# Patient Record
Sex: FEMALE | Race: WHITE | NOT HISPANIC OR LATINO
[De-identification: names, ages, dates, MRNs, and addresses within clinical notes are randomized per-mention and may not be internally consistent; named-entity substitution may affect disease eponyms.]

---

## 2018-04-04 PROBLEM — Z00.00 ENCOUNTER FOR PREVENTIVE HEALTH EXAMINATION: Status: ACTIVE | Noted: 2018-04-04

## 2018-06-05 ENCOUNTER — APPOINTMENT (OUTPATIENT)
Dept: PEDIATRIC ALLERGY IMMUNOLOGY | Facility: CLINIC | Age: 58
End: 2018-06-05
Payer: COMMERCIAL

## 2018-06-05 VITALS
HEIGHT: 64 IN | DIASTOLIC BLOOD PRESSURE: 79 MMHG | OXYGEN SATURATION: 94 % | WEIGHT: 293 LBS | HEART RATE: 90 BPM | BODY MASS INDEX: 50.02 KG/M2 | SYSTOLIC BLOOD PRESSURE: 131 MMHG

## 2018-06-05 DIAGNOSIS — Z80.3 FAMILY HISTORY OF MALIGNANT NEOPLASM OF BREAST: ICD-10-CM

## 2018-06-05 DIAGNOSIS — E03.9 HYPOTHYROIDISM, UNSPECIFIED: ICD-10-CM

## 2018-06-05 DIAGNOSIS — Z86.79 PERSONAL HISTORY OF OTHER DISEASES OF THE CIRCULATORY SYSTEM: ICD-10-CM

## 2018-06-05 DIAGNOSIS — Z01.89 ENCOUNTER FOR OTHER SPECIFIED SPECIAL EXAMINATIONS: ICD-10-CM

## 2018-06-05 DIAGNOSIS — J45.20 MILD INTERMITTENT ASTHMA, UNCOMPLICATED: ICD-10-CM

## 2018-06-05 DIAGNOSIS — T88.7XXA UNSPECIFIED ADVERSE EFFECT OF DRUG OR MEDICAMENT, INITIAL ENCOUNTER: ICD-10-CM

## 2018-06-05 DIAGNOSIS — E78.00 PURE HYPERCHOLESTEROLEMIA, UNSPECIFIED: ICD-10-CM

## 2018-06-05 DIAGNOSIS — R00.2 PALPITATIONS: ICD-10-CM

## 2018-06-05 DIAGNOSIS — Z82.49 FAMILY HISTORY OF ISCHEMIC HEART DISEASE AND OTHER DISEASES OF THE CIRCULATORY SYSTEM: ICD-10-CM

## 2018-06-05 DIAGNOSIS — Z95.0 PRESENCE OF CARDIAC PACEMAKER: ICD-10-CM

## 2018-06-05 DIAGNOSIS — G40.909 EPILEPSY, UNSPECIFIED, NOT INTRACTABLE, W/OUT STATUS EPILEPTICUS: ICD-10-CM

## 2018-06-05 PROCEDURE — 36415 COLL VENOUS BLD VENIPUNCTURE: CPT

## 2018-06-05 PROCEDURE — 95018 ALL TSTG PERQ&IQ DRUGS/BIOL: CPT

## 2018-06-05 PROCEDURE — 99245 OFF/OP CONSLTJ NEW/EST HI 55: CPT | Mod: 25

## 2018-06-05 RX ORDER — EPINEPHRINE 0.3 MG/.3ML
0.3 INJECTION INTRAMUSCULAR
Refills: 0 | Status: ACTIVE | COMMUNITY

## 2018-06-05 RX ORDER — CHOLECALCIFEROL (VITAMIN D3) 50 MCG
50 MCG TABLET ORAL
Refills: 0 | Status: ACTIVE | COMMUNITY

## 2018-06-05 RX ORDER — ALBUTEROL SULFATE 2.5 MG/3ML
(2.5 MG/3ML) SOLUTION RESPIRATORY (INHALATION)
Refills: 0 | Status: ACTIVE | COMMUNITY

## 2018-06-05 RX ORDER — LAMOTRIGINE 250 MG/1
250 TABLET, FILM COATED, EXTENDED RELEASE ORAL
Refills: 0 | Status: ACTIVE | COMMUNITY

## 2018-06-05 RX ORDER — ASPIRIN 81 MG
81 TABLET, DELAYED RELEASE (ENTERIC COATED) ORAL
Refills: 0 | Status: ACTIVE | COMMUNITY

## 2018-06-05 RX ORDER — LEVETIRACETAM 500 MG/1
500 TABLET, FILM COATED ORAL
Refills: 0 | Status: ACTIVE | COMMUNITY

## 2018-06-05 RX ORDER — FOLIC ACID 1 MG/1
1 TABLET ORAL
Refills: 0 | Status: ACTIVE | COMMUNITY

## 2018-06-05 RX ORDER — CYCLOBENZAPRINE HYDROCHLORIDE 10 MG/1
10 TABLET, FILM COATED ORAL
Refills: 0 | Status: ACTIVE | COMMUNITY

## 2018-06-05 RX ORDER — PNV NO.95/FERROUS FUM/FOLIC AC 28MG-0.8MG
TABLET ORAL
Refills: 0 | Status: ACTIVE | COMMUNITY

## 2018-06-05 RX ORDER — ALBUTEROL SULFATE 90 UG/1
108 (90 BASE) AEROSOL, METERED RESPIRATORY (INHALATION)
Refills: 0 | Status: ACTIVE | COMMUNITY

## 2018-06-05 RX ORDER — METOPROLOL SUCCINATE 50 MG/1
50 TABLET, EXTENDED RELEASE ORAL
Refills: 0 | Status: ACTIVE | COMMUNITY

## 2018-06-08 LAB
BASOPHILS # BLD AUTO: 0 K/UL
BASOPHILS NFR BLD AUTO: 0 %
EOSINOPHIL # BLD AUTO: 0 K/UL
EOSINOPHIL NFR BLD AUTO: 0 %
HCT VFR BLD CALC: 47.4 %
HGB BLD-MCNC: 14.8 G/DL
IMM GRANULOCYTES NFR BLD AUTO: 0.2 %
LYMPHOCYTES # BLD AUTO: 1.79 K/UL
LYMPHOCYTES NFR BLD AUTO: 27 %
MAN DIFF?: NORMAL
MCHC RBC-ENTMCNC: 31.2 GM/DL
MCHC RBC-ENTMCNC: 31.6 PG
MCV RBC AUTO: 101.1 FL
MONOCYTES # BLD AUTO: 0.48 K/UL
MONOCYTES NFR BLD AUTO: 7.3 %
NEUTROPHILS # BLD AUTO: 4.34 K/UL
NEUTROPHILS NFR BLD AUTO: 65.5 %
PLATELET # BLD AUTO: 224 K/UL
RBC # BLD: 4.69 M/UL
RBC # FLD: 14.8 %
TRYPTASE: 2.9 NG/ML
WBC # FLD AUTO: 6.62 K/UL

## 2018-06-12 LAB — TOTAL IGE SMQN RAST: 40 KU/L

## 2018-07-03 ENCOUNTER — APPOINTMENT (OUTPATIENT)
Dept: PEDIATRIC ALLERGY IMMUNOLOGY | Facility: CLINIC | Age: 58
End: 2018-07-03

## 2018-07-30 ENCOUNTER — APPOINTMENT (OUTPATIENT)
Dept: PEDIATRIC ALLERGY IMMUNOLOGY | Facility: CLINIC | Age: 58
End: 2018-07-30
Payer: COMMERCIAL

## 2018-07-30 VITALS — SYSTOLIC BLOOD PRESSURE: 159 MMHG | DIASTOLIC BLOOD PRESSURE: 86 MMHG | OXYGEN SATURATION: 97 % | HEART RATE: 86 BPM

## 2018-07-30 VITALS
WEIGHT: 293 LBS | HEART RATE: 85 BPM | DIASTOLIC BLOOD PRESSURE: 67 MMHG | OXYGEN SATURATION: 93 % | HEIGHT: 64 IN | SYSTOLIC BLOOD PRESSURE: 122 MMHG | BODY MASS INDEX: 50.02 KG/M2

## 2018-07-30 VITALS — DIASTOLIC BLOOD PRESSURE: 84 MMHG | OXYGEN SATURATION: 92 % | HEART RATE: 78 BPM | SYSTOLIC BLOOD PRESSURE: 127 MMHG

## 2018-07-30 DIAGNOSIS — T78.40XA ALLERGY, UNSPECIFIED, INITIAL ENCOUNTER: ICD-10-CM

## 2018-07-30 DIAGNOSIS — L50.8 OTHER URTICARIA: ICD-10-CM

## 2018-07-30 DIAGNOSIS — T36.0X5A GENERALIZED SKIN ERUPTION DUE TO DRUGS AND MEDICAMENTS TAKEN INTERNALLY: ICD-10-CM

## 2018-07-30 DIAGNOSIS — Z88.0 ALLERGY STATUS TO PENICILLIN: ICD-10-CM

## 2018-07-30 DIAGNOSIS — Z88.9 ALLERGY STATUS TO UNSPECIFIED DRUGS, MEDICAMENTS AND BIOLOGICAL SUBSTANCES: ICD-10-CM

## 2018-07-30 DIAGNOSIS — L27.0 GENERALIZED SKIN ERUPTION DUE TO DRUGS AND MEDICAMENTS TAKEN INTERNALLY: ICD-10-CM

## 2018-07-30 PROCEDURE — 95076 INGEST CHALLENGE INI 120 MIN: CPT

## 2018-07-30 RX ADMIN — CETIRIZINE HYDROCHLORIDE 0 MG: 10 TABLET ORAL at 00:00

## 2018-08-31 LAB
IGE RECEPTOR AB: 6.4 %
IGE RECEPTOR COMMENT: NORMAL

## 2020-05-01 RX ORDER — CETIRIZINE HYDROCHLORIDE 10 MG/1
10 TABLET, COATED ORAL
Qty: 0 | Refills: 0 | Status: COMPLETED | OUTPATIENT
Start: 2018-07-30

## 2021-05-05 ENCOUNTER — APPOINTMENT (OUTPATIENT)
Dept: OBGYN | Facility: CLINIC | Age: 61
End: 2021-05-05

## 2021-06-23 ENCOUNTER — APPOINTMENT (OUTPATIENT)
Dept: OBGYN | Facility: CLINIC | Age: 61
End: 2021-06-23
Payer: MEDICARE

## 2021-06-23 VITALS
TEMPERATURE: 97.5 F | SYSTOLIC BLOOD PRESSURE: 126 MMHG | DIASTOLIC BLOOD PRESSURE: 64 MMHG | BODY MASS INDEX: 44.05 KG/M2 | WEIGHT: 258 LBS | HEIGHT: 64 IN | RESPIRATION RATE: 16 BRPM

## 2021-06-23 DIAGNOSIS — Z01.411 ENCOUNTER FOR GYNECOLOGICAL EXAMINATION (GENERAL) (ROUTINE) WITH ABNORMAL FINDINGS: ICD-10-CM

## 2021-06-23 DIAGNOSIS — R22.2 LOCALIZED SWELLING, MASS AND LUMP, TRUNK: ICD-10-CM

## 2021-06-23 DIAGNOSIS — Z86.39 PERSONAL HISTORY OF OTHER ENDOCRINE, NUTRITIONAL AND METABOLIC DISEASE: ICD-10-CM

## 2021-06-23 DIAGNOSIS — Z87.09 PERSONAL HISTORY OF OTHER DISEASES OF THE RESPIRATORY SYSTEM: ICD-10-CM

## 2021-06-23 DIAGNOSIS — N63.20 UNSPECIFIED LUMP IN THE LEFT BREAST, UNSPECIFIED QUADRANT: ICD-10-CM

## 2021-06-23 DIAGNOSIS — Z78.9 OTHER SPECIFIED HEALTH STATUS: ICD-10-CM

## 2021-06-23 DIAGNOSIS — C53.9 MALIGNANT NEOPLASM OF CERVIX UTERI, UNSPECIFIED: ICD-10-CM

## 2021-06-23 DIAGNOSIS — Z85.3 PERSONAL HISTORY OF MALIGNANT NEOPLASM OF BREAST: ICD-10-CM

## 2021-06-23 PROCEDURE — 99072 ADDL SUPL MATRL&STAF TM PHE: CPT

## 2021-06-23 PROCEDURE — 99203 OFFICE O/P NEW LOW 30 MIN: CPT | Mod: 25

## 2021-06-23 PROCEDURE — G0101: CPT

## 2021-06-23 NOTE — DISCUSSION/SUMMARY
[FreeTextEntry1] : 62 yo here for well woman exam, findings of left breast mass. \par 1) Health maintenance: \par Pap + HPV, given hx of cervical cancer recommend continued surveillance with screening paps\par Colonoscopy referral given\par \par 2) Left breast mass, hx of left breast cancer in past:\par Diagnostic mammogram bilateral\par Left breast ultrasound\par \par 3) Superficial abdominal mass\par Recommend general surgery evaluation, pt wants to follow up with PCP\par \par 4) Hx of cervical cancer per pt:\par Recommend continued surveillance with pelvic exams and screening paps, no evidence of disease today\par \par Return in 1 yr or prn

## 2021-06-23 NOTE — PHYSICAL EXAM
[Appropriately responsive] : appropriately responsive [Alert] : alert [No Acute Distress] : no acute distress [No Lymphadenopathy] : no lymphadenopathy [Soft] : soft [Non-tender] : non-tender [Non-distended] : non-distended [No HSM] : No HSM [No Lesions] : no lesions [No Mass] : no mass [Oriented x3] : oriented x3 [Examination Of The Breasts] : a normal appearance [No Masses] : no breast masses were palpable [Labia Majora] : normal [Labia Minora] : normal [Normal] : normal [Uterine Adnexae] : normal [No Discharge] : no discharge [___cm] : a ~M [unfilled] ~Ucm superior lateral quadrant mass was palpated [Firm] : firm [Mobile] : mobile [Nontender] : nontender [1:00] : in the 1:00 position [___cm Radial Distance from the Nipple] : [unfilled] ~Ucm radially from the nipple [Atrophy] : atrophy [Dry Mucosa] : dry mucosa [Rectocele] : a rectocele [Absent] : absent [FreeTextEntry7] : 3cm firm mobile superficial mass nontender left midabdomen, obese [FreeTextEntry4] : vaginal cuff intact, no lesions

## 2021-06-23 NOTE — HISTORY OF PRESENT ILLNESS
[Patient reported mammogram was abnormal] : Patient reported mammogram was abnormal [Patient reported PAP Smear was normal] : Patient reported PAP Smear was normal [Patient reported colonoscopy was normal] : Patient reported colonoscopy was normal [postmenopausal] : postmenopausal [Monogamous (Male Partner)] : is monogamous with a male partner [Y] : Positive pregnancy history [Mammogramdate] : 3 yrs ago [TextBox_19] : benign cysts [PapSmeardate] : 3 yrs ago [ColonoscopyDate] : 2016 [LMPDate] : age 28 [PGHxTotal] : 3 [[facility] : 2 [[facility]] : 2 [PGxEctopic] : 1 [FreeTextEntry1] : NSVDx2, ectopic x 1. +h/o fibroids and cervical cancer s/p hyst/BSO, denies STI

## 2021-06-23 NOTE — LETTER GREETING
[Dear  ___] : Dear  [unfilled], [FreeTextEntry1] : I had the pleasure of evaluating your patient, DOMONIQUE WHITT . Please see my summary of recommendations followed by my full note. \par \par Thank you for allowing me to participate in the care of this patient. If you have any questions, please do not hesitate to contact me.\par \par Sincerely,\par \par Adilia Baez MD, FACOG \par [facility]\par Obstetrics and Gynecology in [place]\[address], [facility] [address]\[place] [address]\[facility] Phone: [phone] Fax: [phone]

## 2021-06-25 LAB — HPV HIGH+LOW RISK DNA PNL CVX: NOT DETECTED

## 2021-06-28 LAB — CYTOLOGY CVX/VAG DOC THIN PREP: ABNORMAL

## 2022-04-07 ENCOUNTER — OUTPATIENT (OUTPATIENT)
Dept: OUTPATIENT SERVICES | Facility: HOSPITAL | Age: 62
LOS: 1 days | End: 2022-04-07
Payer: MEDICARE

## 2022-04-07 VITALS
RESPIRATION RATE: 20 BRPM | HEIGHT: 65 IN | WEIGHT: 293 LBS | DIASTOLIC BLOOD PRESSURE: 70 MMHG | TEMPERATURE: 98 F | HEART RATE: 74 BPM | SYSTOLIC BLOOD PRESSURE: 130 MMHG | OXYGEN SATURATION: 96 %

## 2022-04-07 DIAGNOSIS — Z29.9 ENCOUNTER FOR PROPHYLACTIC MEASURES, UNSPECIFIED: ICD-10-CM

## 2022-04-07 DIAGNOSIS — Z98.1 ARTHRODESIS STATUS: Chronic | ICD-10-CM

## 2022-04-07 DIAGNOSIS — Z01.818 ENCOUNTER FOR OTHER PREPROCEDURAL EXAMINATION: ICD-10-CM

## 2022-04-07 DIAGNOSIS — Z98.890 OTHER SPECIFIED POSTPROCEDURAL STATES: Chronic | ICD-10-CM

## 2022-04-07 DIAGNOSIS — I25.10 ATHEROSCLEROTIC HEART DISEASE OF NATIVE CORONARY ARTERY WITHOUT ANGINA PECTORIS: ICD-10-CM

## 2022-04-07 DIAGNOSIS — M46.02 SPINAL ENTHESOPATHY, CERVICAL REGION: ICD-10-CM

## 2022-04-07 DIAGNOSIS — Z90.710 ACQUIRED ABSENCE OF BOTH CERVIX AND UTERUS: Chronic | ICD-10-CM

## 2022-04-07 DIAGNOSIS — Z90.49 ACQUIRED ABSENCE OF OTHER SPECIFIED PARTS OF DIGESTIVE TRACT: Chronic | ICD-10-CM

## 2022-04-07 DIAGNOSIS — Z13.89 ENCOUNTER FOR SCREENING FOR OTHER DISORDER: ICD-10-CM

## 2022-04-07 DIAGNOSIS — Z98.49 CATARACT EXTRACTION STATUS, UNSPECIFIED EYE: Chronic | ICD-10-CM

## 2022-04-07 DIAGNOSIS — I10 ESSENTIAL (PRIMARY) HYPERTENSION: ICD-10-CM

## 2022-04-07 DIAGNOSIS — Z95.0 PRESENCE OF CARDIAC PACEMAKER: Chronic | ICD-10-CM

## 2022-04-07 LAB
A1C WITH ESTIMATED AVERAGE GLUCOSE RESULT: 5.8 % — HIGH (ref 4–5.6)
ALBUMIN SERPL ELPH-MCNC: 4.1 G/DL — SIGNIFICANT CHANGE UP (ref 3.3–5.2)
ALP SERPL-CCNC: 81 U/L — SIGNIFICANT CHANGE UP (ref 40–120)
ALT FLD-CCNC: 13 U/L — SIGNIFICANT CHANGE UP
ANION GAP SERPL CALC-SCNC: 12 MMOL/L — SIGNIFICANT CHANGE UP (ref 5–17)
APTT BLD: 32.6 SEC — SIGNIFICANT CHANGE UP (ref 27.5–35.5)
AST SERPL-CCNC: 17 U/L — SIGNIFICANT CHANGE UP
BASOPHILS # BLD AUTO: 0.03 K/UL — SIGNIFICANT CHANGE UP (ref 0–0.2)
BASOPHILS NFR BLD AUTO: 0.5 % — SIGNIFICANT CHANGE UP (ref 0–2)
BILIRUB SERPL-MCNC: 0.4 MG/DL — SIGNIFICANT CHANGE UP (ref 0.4–2)
BLD GP AB SCN SERPL QL: SIGNIFICANT CHANGE UP
BUN SERPL-MCNC: 19.2 MG/DL — SIGNIFICANT CHANGE UP (ref 8–20)
CALCIUM SERPL-MCNC: 9.1 MG/DL — SIGNIFICANT CHANGE UP (ref 8.6–10.2)
CHLORIDE SERPL-SCNC: 104 MMOL/L — SIGNIFICANT CHANGE UP (ref 98–107)
CO2 SERPL-SCNC: 24 MMOL/L — SIGNIFICANT CHANGE UP (ref 22–29)
CREAT SERPL-MCNC: 0.62 MG/DL — SIGNIFICANT CHANGE UP (ref 0.5–1.3)
EGFR: 100 ML/MIN/1.73M2 — SIGNIFICANT CHANGE UP
EOSINOPHIL # BLD AUTO: 0.15 K/UL — SIGNIFICANT CHANGE UP (ref 0–0.5)
EOSINOPHIL NFR BLD AUTO: 2.3 % — SIGNIFICANT CHANGE UP (ref 0–6)
ESTIMATED AVERAGE GLUCOSE: 120 MG/DL — HIGH (ref 68–114)
GLUCOSE SERPL-MCNC: 114 MG/DL — HIGH (ref 70–99)
HCT VFR BLD CALC: 46.1 % — HIGH (ref 34.5–45)
HGB BLD-MCNC: 14.8 G/DL — SIGNIFICANT CHANGE UP (ref 11.5–15.5)
IMM GRANULOCYTES NFR BLD AUTO: 0.5 % — SIGNIFICANT CHANGE UP (ref 0–1.5)
INR BLD: 1.03 RATIO — SIGNIFICANT CHANGE UP (ref 0.88–1.16)
LYMPHOCYTES # BLD AUTO: 1.53 K/UL — SIGNIFICANT CHANGE UP (ref 1–3.3)
LYMPHOCYTES # BLD AUTO: 23.8 % — SIGNIFICANT CHANGE UP (ref 13–44)
MCHC RBC-ENTMCNC: 30.8 PG — SIGNIFICANT CHANGE UP (ref 27–34)
MCHC RBC-ENTMCNC: 32.1 GM/DL — SIGNIFICANT CHANGE UP (ref 32–36)
MCV RBC AUTO: 95.8 FL — SIGNIFICANT CHANGE UP (ref 80–100)
MONOCYTES # BLD AUTO: 0.63 K/UL — SIGNIFICANT CHANGE UP (ref 0–0.9)
MONOCYTES NFR BLD AUTO: 9.8 % — SIGNIFICANT CHANGE UP (ref 2–14)
MRSA PCR RESULT.: SIGNIFICANT CHANGE UP
NEUTROPHILS # BLD AUTO: 4.07 K/UL — SIGNIFICANT CHANGE UP (ref 1.8–7.4)
NEUTROPHILS NFR BLD AUTO: 63.1 % — SIGNIFICANT CHANGE UP (ref 43–77)
PLATELET # BLD AUTO: 187 K/UL — SIGNIFICANT CHANGE UP (ref 150–400)
POTASSIUM SERPL-MCNC: 4.8 MMOL/L — SIGNIFICANT CHANGE UP (ref 3.5–5.3)
POTASSIUM SERPL-SCNC: 4.8 MMOL/L — SIGNIFICANT CHANGE UP (ref 3.5–5.3)
PROT SERPL-MCNC: 7.6 G/DL — SIGNIFICANT CHANGE UP (ref 6.6–8.7)
PROTHROM AB SERPL-ACNC: 12 SEC — SIGNIFICANT CHANGE UP (ref 10.5–13.4)
RBC # BLD: 4.81 M/UL — SIGNIFICANT CHANGE UP (ref 3.8–5.2)
RBC # FLD: 13 % — SIGNIFICANT CHANGE UP (ref 10.3–14.5)
S AUREUS DNA NOSE QL NAA+PROBE: DETECTED
SODIUM SERPL-SCNC: 139 MMOL/L — SIGNIFICANT CHANGE UP (ref 135–145)
WBC # BLD: 6.44 K/UL — SIGNIFICANT CHANGE UP (ref 3.8–10.5)
WBC # FLD AUTO: 6.44 K/UL — SIGNIFICANT CHANGE UP (ref 3.8–10.5)

## 2022-04-07 PROCEDURE — 93005 ELECTROCARDIOGRAM TRACING: CPT

## 2022-04-07 PROCEDURE — 93010 ELECTROCARDIOGRAM REPORT: CPT

## 2022-04-07 PROCEDURE — G0463: CPT

## 2022-04-07 RX ORDER — MUPIROCIN 20 MG/G
1 OINTMENT TOPICAL
Qty: 1 | Refills: 0
Start: 2022-04-07 | End: 2022-04-11

## 2022-04-07 RX ORDER — LAMOTRIGINE 25 MG/1
250 TABLET, ORALLY DISINTEGRATING ORAL
Qty: 0 | Refills: 0 | DISCHARGE

## 2022-04-07 NOTE — H&P PST ADULT - ASSESSMENT
63 y/o female scheduled for anterior cervical discectomy and fusion with instrumentation C5-C6 with Dr. Santosh Mclean on 2022.  -Medical evaluation pending  - Cardiac cleared in chart  -verbalized understanding of all PST instructions including infection prevention  -Nothing to eat or chew after midnight the night before except meds  - Will complete fluid protocol  -Percocet as ordered for pain if needed  -Covid swab pending     OPIOID RISK TOOL    ISIDRA EACH BOX THAT APPLIES AND ADD TOTALS AT THE END    FAMILY HISTORY OF SUBSTANCE ABUSE                 FEMALE         MALE                                                Alcohol                             [  ]1 pt          [  ]3pts                                               Illegal Durgs                     [  ]2 pts        [  ]3pts                                               Rx Drugs                           [  ]4 pts        [  ]4 pts    PERSONAL HISTORY OF SUBSTANCE ABUSE                                                                                          Alcohol                             [  ]3 pts       [  ]3 pts                                               Illegal Drugs                     [  ]4 pts        [  ]4 pts                                               Rx Drugs                           [  ]5 pts        [  ]5 pts    AGE BETWEEN 16-45 YEARS                                      [  ]1 pt         [  ]1 pt    HISTORY OF PREADOLESCENT   SEXUAL ABUSE                                                             [  ]3 pts        [  ]0pts    PSYCHOLOGICAL DISEASE                     ADD, OCD, Bipolar, Schizophrenia        [  ]2 pts         [  ]2 pts                      Depression                                               [  ]1 pt           [  ]1 pt           SCORING TOTAL   (add numbers and type here)              (0)                                     A score of 3 or lower indicated LOW risk for future opioid abuse  A score of 4 to 7 indicated moderate risk for future opioid abuse  A score of 8 or higher indicates a high risk for opioid abuse    CAPRINI SCORE [CLOT]    AGE RELATED RISK FACTORS                                                       MOBILITY RELATED FACTORS  [ ] Age 41-60 years                                            (1 Point)                  [ ] Bed rest                                                        (1 Point)  [x ] Age: 61-74 years                                           (2 Points)                 [ ] Plaster cast                                                   (2 Points)  [ ] Age= 75 years                                              (3 Points)                 [ ] Bed bound for more than 72 hours                 (2 Points)    DISEASE RELATED RISK FACTORS                                               GENDER SPECIFIC FACTORS  [x ] Edema in the lower extremities                       (1 Point)                  [ ] Pregnancy                                                     (1 Point)  [ ] Varicose veins                                               (1 Point)                  [ ] Post-partum < 6 weeks                                   (1 Point)             [ x] BMI > 25 Kg/m2                                            (1 Point)                  [ ] Hormonal therapy  or oral contraception          (1 Point)                 [ ] Sepsis (in the previous month)                        (1 Point)                  [ ] History of pregnancy complications                 (1 point)  [ ] Pneumonia or serious lung disease                                               [ ] Unexplained or recurrent                     (1 Point)           (in the previous month)                               (1 Point)  [ ] Abnormal pulmonary function test                     (1 Point)                 SURGERY RELATED RISK FACTORS  [ ] Acute myocardial infarction                              (1 Point)                 [ ]  Section                                             (1 Point)  [ ] Congestive heart failure (in the previous month)  (1 Point)               [ ] Minor surgery                                                  (1 Point)   [ ] Inflammatory bowel disease                             (1 Point)                 [ ] Arthroscopic surgery                                        (2 Points)  [ ] Central venous access                                      (2 Points)                [x ] General surgery lasting more than 45 minutes   (2 Points)       [ ] Stroke (in the previous month)                          (5 Points)               [ ] Elective arthroplasty                                         (5 Points)                                                                                                                                               HEMATOLOGY RELATED FACTORS                                                 TRAUMA RELATED RISK FACTORS  [ ] Prior episodes of VTE                                     (3 Points)                [ ] Fracture of the hip, pelvis, or leg                       (5 Points)  [ ] Positive family history for VTE                         (3 Points)                 [ ] Acute spinal cord injury (in the previous month)  (5 Points)  [ ] Prothrombin 71141 A                                     (3 Points)                 [ ] Paralysis  (less than 1 month)                             (5 Points)  [ ] Factor V Leiden                                             (3 Points)                  [ ] Multiple Trauma within 1 month                        (5 Points)  [ ] Lupus anticoagulants                                     (3 Points)                                                           [ ] Anticardiolipin antibodies                               (3 Points)                                                       [ ] High homocysteine in the blood                      (3 Points)                                             [ ] Other congenital or acquired thrombophilia      (3 Points)                                                [ ] Heparin induced thrombocytopenia                  (3 Points)                                          Total Score [      7    ]    Caprini Score 0 - 2:  Low Risk, No VTE Prophylaxis required for most patients, encourage ambulation  Caprini Score 3 - 6:  At Risk, pharmacologic VTE prophylaxis is indicated for most patients (in the absence of a contraindication)  Caprini Score Greater than or = 7:  High Risk, pharmacologic VTE prophylaxis is indicated for most patients (in the absence of a contraindication)

## 2022-04-07 NOTE — H&P PST ADULT - NSICDXPASTSURGICALHX_GEN_ALL_CORE_FT
PAST SURGICAL HISTORY:  Cardiac pacemaker 2002    H/O arthroscopy left knee- 2004; right knee 2x 2003 and 2004    H/O cataract extraction 2013    H/O decompression of ulnar nerve bilateral arms 2018/2019    H/O spinal fusion 2004    H/O total hysterectomy 1989    History of appendectomy 1986    History of cholecystectomy 1985    S/P carpal tunnel release 2004

## 2022-04-07 NOTE — PATIENT PROFILE ADULT - FALL HARM RISK - HARM RISK INTERVENTIONS

## 2022-04-07 NOTE — H&P PST ADULT - PROBLEM SELECTOR PLAN 5
Caprini score 7- High risk  Surgical team assess/recommend mechanical/pharmacological measures for VTE prophylaxis

## 2022-04-07 NOTE — CHART NOTE - NSCHARTNOTEFT_GEN_A_CORE
Search Terms: pallavi orr, 1960Search Date: 04/07/2022 14:00:07 PM  The Drug Utilization Report below displays all of the controlled substance prescriptions, if any, that your patient has filled in the last twelve months. The information displayed on this report is compiled from pharmacy submissions to the Department, and accurately reflects the information as submitted by the pharmacies.    This report was requested by: Yanelis Reveles | Reference #: [id number]    You have not added a MELISSA number. Keeping your MELISSA number(s) up to date on the My MELISSA # page will enable the separation of your prescriptions from others in the search results.    Others' Prescriptions  Patient Name: Pallavi OrrBirth Date: 1960  Address: [address] [place] [id number]: Female  Rx Written	Rx Dispensed	Drug	Quantity	Days Supply	Prescriber Name	Prescriber Melissa #	Payment Method	Dispenser  03/11/2022	03/11/2022	oxycodone-acetaminophen 5-325 mg tab	90	30	Martinez, Gulle N	[id number]	Medicare	[id number]  02/11/2022	02/12/2022	oxycodone-acetaminophen 5-325 mg tablet	90	30	Martinez, Gulle N	[id number]	Medicare	[id number]  01/14/2022	01/15/2022	oxycodone-acetaminophen 5-325 mg tablet	90	30	Martinez, Gulle N	[id number]	Medicare	[id number]  12/17/2021	12/18/2021	oxycodone-acetaminophen 5-325 mg tablet	90	30	Martinez, Gulle N	[id number]	Medicare	[id number]  11/19/2021	11/21/2021	oxycodone-acetaminophen 5-325 mg tablet	90	30	Martinez, Gulle N	[id number]	Medicare	[id number]  10/22/2021	10/25/2021	oxycodone-acetaminophen 5-325 mg tab	90	30	Martinez, Gulle N	[id number]	Medicare	[id number]  09/24/2021	09/24/2021	oxycodone-acetaminophen 5-325 mg tablet	90	30	Martinez, Gulle N	[id number]	Medicare	[id number]  08/19/2021	08/21/2021	oxycodone-acetaminophen 5-325 mg tablet	90	30	Martinez, Gulle N	[id number]	Medicare	[id number]  07/22/2021	07/25/2021	oxycodone-acetaminophen 5-325 mg tablet	90	30	Martinez, Gulle N	[id number]	Medicare	[id number]  06/24/2021	06/27/2021	oxycodone-acetaminophen 5-325 mg tablet	90	30	Martinez, Gulle N	[id number]	Medicare	[id number]  05/27/2021	05/27/2021	oxycodone-acetaminophen 5-325 mg tablet	90	30	Martinez, Gulle N	[id number]	Medicare	[id number]  04/16/2021	04/25/2021	oxycodone-acetaminophen 5-325 mg tablet	90	30	Martinez, Gulle N	[id number]	Medicare	[id number]  * - Drugs marked with an asterisk are compound drugs. If the compound drug is made up of more than one controlled substance, then each controlled substance will be a separate row in the table.

## 2022-04-07 NOTE — H&P PST ADULT - HISTORY OF PRESENT ILLNESS
cervical pain for about 10 years- fusion completed in 2002 and relief in 2011. Patient has been maintained on oxycodone and flexeril with some effect. Pain has been getting progressively worse. Trigger point njection and epidural  completed fall 2021 without any relief. C/o numbness and tingling right hand , cannot hold anything, always dropping items, weakness. Describes as burning/aching. Reports today pain leve is 5/10- but daily basis range from 4 to 6. Pain cause difficulties with turning neck around. No radiation to left arm. Difficulties with taking shower, brushing hair performing activities of daily living as she right handed. Paient also c/o pain to both knees and lower back related to arthritis 63 y/o female with PMH of HTN, hypothyroidism, breast ca, CAD, hypothyroidism, seizures last on 7 years ago, spinal stenosis presents to PST today oending anterior cervical discectomy and fusion with instrumentation C5-C6 with Dr. Santosh Mclean on 4/21/2022. Reports a hx of cervical pain for about 10 years and completed cervical fusion in 2002. Reports relief for years until 2011. She has been maintained on percocet and flexeril with some effect. Pain has been getting progressively worse and received  Trigger pointi njection and epidural fall 2021 without any relief. Describes pain as burning and acing with numbness and tingling to right hand . Reports difficulties with holding anything and always dropping items. C/o difficulties with taking a shower, brushing hair performing activities of daily living as she right handed. C/o severe weakness to right arm. Reports a pain level of is 5/10 today but daily basis range from 4 to 6 on the numerical pain scale. Reports difficulties with turning neck around. Denies any pain radiation to left arm.  Also c/o pain to bilateral knees and lower back related to arthritis and spinal stenosis.  61 y/o female with PMH of HTN, hypothyroidism, breast ca, CAD, hypothyroidism, seizures last on 7 years ago, spinal stenosis presents to PST today pending anterior cervical discectomy and fusion with instrumentation C5-C6 with Dr. Santosh Mclean on 4/21/2022. Reports a hx of cervical pain for about 10 years and completed cervical fusion in 2002. Reports relief for years until 2011. She has been maintained on percocet and flexeril with some effect. Pain has been getting progressively worse and received  Trigger pointi njection and epidural fall 2021 without any relief. Describes pain as burning and acing with numbness and tingling to right hand . Reports difficulties with holding anything and always dropping items. C/o difficulties with taking a shower, brushing hair performing activities of daily living as she right handed. C/o severe weakness to right arm. Reports a pain level of is 5/10 today but daily basis range from 4 to 6 on the numerical pain scale. Reports difficulties with turning neck around. Denies any pain radiation to left arm.  Also c/o pain to bilateral knees and lower back related to arthritis and spinal stenosis.

## 2022-04-07 NOTE — H&P PST ADULT - NSICDXFAMILYHX_GEN_ALL_CORE_FT
FAMILY HISTORY:  Father  Still living? No  FHx: heart disease, Age at diagnosis: Age Unknown    Mother  Still living? No  FHx: breast cancer, Age at diagnosis: Age Unknown

## 2022-04-07 NOTE — H&P PST ADULT - PROBLEM SELECTOR PLAN 1
Scheduled for anterior cervical discectomy and fusion with instrumentation C5-C6 with Dr. Santosh Mclean on 4/21/2022.  -Medical evaluation pending  - Cardiac cleared in chart  -verbalized understanding of all PST instructions including infection prevention  -Nothing to eat or chew after midnight the night before except meds  - Will complete fluid protocol  -Percocet as ordered for pain if needed  -Covid swab pending

## 2022-04-07 NOTE — H&P PST ADULT - NSICDXPASTMEDICALHX_GEN_ALL_CORE_FT
PAST MEDICAL HISTORY:  Arthritis     Asthma Last exacerbation1 year ago-2021    At risk for sleep apnea Bang score 4    Breast CA left    CAD (coronary artery disease)     Hypertension     Hypothyroidism     Seizure last seizure 7 years ago    Spinal stenosis lumbar

## 2022-04-07 NOTE — H&P PST ADULT - MUSCULOSKELETAL
details… detailed exam right arm limited ROM and weakness. Left arm intact./no joint swelling/no joint erythema/decreased ROM/decreased ROM due to pain/diminished strength

## 2022-04-07 NOTE — H&P PST ADULT - MS GEN HX ROS MEA POS PC
cervical pain/weakness and right hand/arm, lower back and bilateral knees arthrisi/arthralgia/arthritis/muscle weakness/stiffness/arm pain R

## 2022-04-12 PROBLEM — Z91.89 OTHER SPECIFIED PERSONAL RISK FACTORS, NOT ELSEWHERE CLASSIFIED: Chronic | Status: ACTIVE | Noted: 2022-04-07

## 2022-04-12 PROBLEM — M48.00 SPINAL STENOSIS, SITE UNSPECIFIED: Chronic | Status: ACTIVE | Noted: 2022-04-07

## 2022-04-12 PROBLEM — R56.9 UNSPECIFIED CONVULSIONS: Chronic | Status: ACTIVE | Noted: 2022-04-07

## 2022-04-12 PROBLEM — M19.90 UNSPECIFIED OSTEOARTHRITIS, UNSPECIFIED SITE: Chronic | Status: ACTIVE | Noted: 2022-04-07

## 2022-04-12 PROBLEM — I25.10 ATHEROSCLEROTIC HEART DISEASE OF NATIVE CORONARY ARTERY WITHOUT ANGINA PECTORIS: Chronic | Status: ACTIVE | Noted: 2022-04-07

## 2022-04-12 PROBLEM — J45.909 UNSPECIFIED ASTHMA, UNCOMPLICATED: Chronic | Status: ACTIVE | Noted: 2022-04-07

## 2022-04-12 PROBLEM — C50.919 MALIGNANT NEOPLASM OF UNSPECIFIED SITE OF UNSPECIFIED FEMALE BREAST: Chronic | Status: ACTIVE | Noted: 2022-04-07

## 2022-04-12 PROBLEM — E03.9 HYPOTHYROIDISM, UNSPECIFIED: Chronic | Status: ACTIVE | Noted: 2022-04-07

## 2022-04-12 PROBLEM — I10 ESSENTIAL (PRIMARY) HYPERTENSION: Chronic | Status: ACTIVE | Noted: 2022-04-07

## 2022-04-13 ENCOUNTER — APPOINTMENT (OUTPATIENT)
Dept: SPINE | Facility: CLINIC | Age: 62
End: 2022-04-13

## 2022-04-21 ENCOUNTER — APPOINTMENT (OUTPATIENT)
Dept: ORTHOPEDIC SURGERY | Facility: HOSPITAL | Age: 62
End: 2022-04-21

## 2022-04-22 ENCOUNTER — RESULT REVIEW (OUTPATIENT)
Age: 62
End: 2022-04-22

## 2022-05-02 ENCOUNTER — APPOINTMENT (OUTPATIENT)
Dept: SPINE | Facility: CLINIC | Age: 62
End: 2022-05-02

## 2022-05-03 ENCOUNTER — APPOINTMENT (OUTPATIENT)
Dept: ORTHOPEDIC SURGERY | Facility: HOSPITAL | Age: 62
End: 2022-05-03

## 2022-05-06 ENCOUNTER — TRANSCRIPTION ENCOUNTER (OUTPATIENT)
Age: 62
End: 2022-05-06

## 2022-05-13 ENCOUNTER — APPOINTMENT (OUTPATIENT)
Dept: ORTHOPEDIC SURGERY | Facility: CLINIC | Age: 62
End: 2022-05-13
Payer: MEDICARE

## 2022-05-13 VITALS — WEIGHT: 293 LBS | HEIGHT: 64 IN | BODY MASS INDEX: 50.02 KG/M2

## 2022-05-13 DIAGNOSIS — Z86.79 PERSONAL HISTORY OF OTHER DISEASES OF THE CIRCULATORY SYSTEM: ICD-10-CM

## 2022-05-13 DIAGNOSIS — Z87.898 PERSONAL HISTORY OF OTHER SPECIFIED CONDITIONS: ICD-10-CM

## 2022-05-13 DIAGNOSIS — Z86.39 PERSONAL HISTORY OF OTHER ENDOCRINE, NUTRITIONAL AND METABOLIC DISEASE: ICD-10-CM

## 2022-05-13 PROCEDURE — 99214 OFFICE O/P EST MOD 30 MIN: CPT

## 2022-05-15 PROBLEM — Z86.79 HISTORY OF HYPERTENSION: Status: RESOLVED | Noted: 2022-05-13 | Resolved: 2022-05-15

## 2022-05-15 PROBLEM — Z86.39 HISTORY OF DIABETES MELLITUS: Status: RESOLVED | Noted: 2022-05-13 | Resolved: 2022-05-15

## 2022-05-15 PROBLEM — Z87.898 HISTORY OF SEIZURE: Status: RESOLVED | Noted: 2022-05-13 | Resolved: 2022-05-15

## 2022-05-15 NOTE — DISCUSSION/SUMMARY
[Medication Risks Reviewed] : Medication risks reviewed [Surgical risks reviewed] : Surgical risks reviewed [de-identified] : Discussed proper body mechanics and modified physical activity to avoid aggravation of symptoms. Patient will continue with at home exercises/stretches as best tolerated. Discussed at length surgical intervention in the form of ACDF. Explained medical clearance procedure. Discussed risks and benefits. Patient will schedule surgery date.

## 2022-05-15 NOTE — PHYSICAL EXAM
[Normal Coordination] : normal coordination [Normal DTR UE/LE] : normal DTR UE/LE  [Normal Sensation] : normal sensation [Normal Mood and Affect] : normal mood and affect [Orientated] : orientated [Able to Communicate] : able to communicate [Normal Skin] : normal skin [No Rash] : no rash [No Ulcers] : no ulcers [No Lesions] : no lesions [No obvious lymphadenopathy in areas examined] : no obvious lymphadenopathy in areas examined [Well Developed] : well developed [Well Nourished] : well nourished [Peripheral vascular exam is grossly normal] : peripheral vascular exam is grossly normal [No Respiratory Distress] : no respiratory distress [Lungs clear to auscultation bilaterally] : lungs clear to auscultation bilaterally [NL (45)] : right lateral flexion 45 degrees [NL (80)] : right lateral rotation 80 degrees [5___] : right grasp 5[unfilled]/5 [Biceps 2+] : biceps 2+ [Triceps 2+] : triceps 2+ [Brachioradialis 2+] : brachioradialis 2+ [Flexion] : flexion [Extension] : extension [] : no rhomboid tenderness

## 2022-05-15 NOTE — DATA REVIEWED
[CT Scan] : CT scan [Cervical Spine] : cervical spine [Report was reviewed and noted in the chart] : The report was reviewed and noted in the chart [I independently reviewed and interpreted images and report] : I independently reviewed and interpreted images and report [I reviewed the films/CD and additionally noted] : I reviewed the films/CD and additionally noted [FreeTextEntry1] : I stop paperwork reviewed

## 2022-05-15 NOTE — HISTORY OF PRESENT ILLNESS
[Sudden] : sudden [7] : 7 [Dull/Aching] : dull/aching [Occasional] : occasional [Household chores] : household chores [Sleep] : sleep [Meds] : meds [Disabled] : Work status: disabled [de-identified] : Patient states the severity of her pain has gotten worse since her previous visit. No new complaints. Patient still has pain radiating into RT upper extremity. Patient also complains of RT hand weakness and numbness/tingling sensation. Treatment with formal physical therapy, epidural injections, and trigger point injections did not provide relief. Patient states she feels ready to follow through with surgical intervention. [] : no [FreeTextEntry1] : Lucía  [FreeTextEntry7] : right arm  [de-identified] : 2021 [de-identified] : movement  [de-identified] : sx was scheduled for 5/5 pt postponed.

## 2022-06-01 ENCOUNTER — APPOINTMENT (OUTPATIENT)
Dept: ORTHOPEDIC SURGERY | Facility: CLINIC | Age: 62
End: 2022-06-01

## 2022-06-01 VITALS — HEIGHT: 64 IN | WEIGHT: 293 LBS | BODY MASS INDEX: 50.02 KG/M2

## 2022-06-01 PROCEDURE — 99213 OFFICE O/P EST LOW 20 MIN: CPT

## 2022-06-01 NOTE — HISTORY OF PRESENT ILLNESS
[de-identified] : Patient returns to the office for pre-operative visit. She is scheduled for ACDF C5/6 on 6/16/22 at [facility]par \par Since the last office visit Her symptoms have remained unchanged.  She continues with neck pain and right arm pain and altered sensation.\par \par Preoperative laboratory testing is pending for later this week. She has is also scheduled for a cardiology f/u visit and echo later this week. Medical clearance is pending.\par \par \par

## 2022-06-01 NOTE — PHYSICAL EXAM
[Flexion] : flexion [Extension] : extension [Rotation to left] : rotation to left [Rotation to right] : rotation to right [] : positive Spurling [Right Deltoid] : right deltoid [Right Radial Forearm] : right radial forearm [de-identified] : globally poor effort in UE's with muscle testing. No gross weakness in any single muscle group noted. Left forearm is larger than right and this is reported as baseline by the patient  [TWNoteComboBox7] : forward flexion 20 degrees [de-identified] : extension 20 degrees [de-identified] : left lateral flexion 10 degrees [de-identified] : left lateral rotation 25 degrees [de-identified] : right lateral flexion 10 degrees [TWNoteComboBox6] : right lateral rotation 25 degrees

## 2022-06-01 NOTE — DISCUSSION/SUMMARY
[Surgical risks reviewed] : Surgical risks reviewed [de-identified] : Together in the office we reviewed the current diagnosis and its contributions to Her symptoms.  The planned surgical procedure was reviewed and explained to Her satisfaction including the risks and benefits.  This risk benefit conversation included, but was not limited to infection, bleeding, neurological injury, CSF leak, need for dural repair, hardware mal position, pseudoarthrosis, need for additional operation in the future, risks of general anesthesia. Expected outcomes included reduction in pain, improvement in function and expected recovery timeframe. All questions were answered to their satisfaction.\par \par Patient was reminded to bring their diagnostic imaging to the OR on the date of surgery.\par \par Provided we obtain medical and cardiac clearances along with normal labs will proceed with ACDF\par \par \par \par \par

## 2022-06-02 ENCOUNTER — OUTPATIENT (OUTPATIENT)
Dept: OUTPATIENT SERVICES | Facility: HOSPITAL | Age: 62
LOS: 1 days | End: 2022-06-02
Payer: MEDICARE

## 2022-06-02 VITALS
RESPIRATION RATE: 16 BRPM | HEART RATE: 86 BPM | TEMPERATURE: 98 F | HEIGHT: 65 IN | DIASTOLIC BLOOD PRESSURE: 80 MMHG | WEIGHT: 293 LBS | OXYGEN SATURATION: 96 % | SYSTOLIC BLOOD PRESSURE: 140 MMHG

## 2022-06-02 DIAGNOSIS — Z91.89 OTHER SPECIFIED PERSONAL RISK FACTORS, NOT ELSEWHERE CLASSIFIED: ICD-10-CM

## 2022-06-02 DIAGNOSIS — Z95.0 PRESENCE OF CARDIAC PACEMAKER: Chronic | ICD-10-CM

## 2022-06-02 DIAGNOSIS — Z98.890 OTHER SPECIFIED POSTPROCEDURAL STATES: Chronic | ICD-10-CM

## 2022-06-02 DIAGNOSIS — E03.9 HYPOTHYROIDISM, UNSPECIFIED: ICD-10-CM

## 2022-06-02 DIAGNOSIS — Z90.49 ACQUIRED ABSENCE OF OTHER SPECIFIED PARTS OF DIGESTIVE TRACT: Chronic | ICD-10-CM

## 2022-06-02 DIAGNOSIS — Z29.9 ENCOUNTER FOR PROPHYLACTIC MEASURES, UNSPECIFIED: ICD-10-CM

## 2022-06-02 DIAGNOSIS — Z01.818 ENCOUNTER FOR OTHER PREPROCEDURAL EXAMINATION: ICD-10-CM

## 2022-06-02 DIAGNOSIS — I25.10 ATHEROSCLEROTIC HEART DISEASE OF NATIVE CORONARY ARTERY WITHOUT ANGINA PECTORIS: ICD-10-CM

## 2022-06-02 DIAGNOSIS — I10 ESSENTIAL (PRIMARY) HYPERTENSION: ICD-10-CM

## 2022-06-02 DIAGNOSIS — Z98.49 CATARACT EXTRACTION STATUS, UNSPECIFIED EYE: Chronic | ICD-10-CM

## 2022-06-02 DIAGNOSIS — Z98.1 ARTHRODESIS STATUS: Chronic | ICD-10-CM

## 2022-06-02 DIAGNOSIS — Z90.710 ACQUIRED ABSENCE OF BOTH CERVIX AND UTERUS: Chronic | ICD-10-CM

## 2022-06-02 DIAGNOSIS — M48.02 SPINAL STENOSIS, CERVICAL REGION: ICD-10-CM

## 2022-06-02 DIAGNOSIS — J45.909 UNSPECIFIED ASTHMA, UNCOMPLICATED: ICD-10-CM

## 2022-06-02 DIAGNOSIS — R56.9 UNSPECIFIED CONVULSIONS: ICD-10-CM

## 2022-06-02 LAB
A1C WITH ESTIMATED AVERAGE GLUCOSE RESULT: 5.8 % — HIGH (ref 4–5.6)
ANION GAP SERPL CALC-SCNC: 11 MMOL/L — SIGNIFICANT CHANGE UP (ref 5–17)
APTT BLD: 32 SEC — SIGNIFICANT CHANGE UP (ref 27.5–35.5)
BASOPHILS # BLD AUTO: 0.06 K/UL — SIGNIFICANT CHANGE UP (ref 0–0.2)
BASOPHILS NFR BLD AUTO: 0.9 % — SIGNIFICANT CHANGE UP (ref 0–2)
BLD GP AB SCN SERPL QL: SIGNIFICANT CHANGE UP
BUN SERPL-MCNC: 13.6 MG/DL — SIGNIFICANT CHANGE UP (ref 8–20)
CALCIUM SERPL-MCNC: 9 MG/DL — SIGNIFICANT CHANGE UP (ref 8.6–10.2)
CHLORIDE SERPL-SCNC: 105 MMOL/L — SIGNIFICANT CHANGE UP (ref 98–107)
CO2 SERPL-SCNC: 25 MMOL/L — SIGNIFICANT CHANGE UP (ref 22–29)
CREAT SERPL-MCNC: 0.6 MG/DL — SIGNIFICANT CHANGE UP (ref 0.5–1.3)
EGFR: 101 ML/MIN/1.73M2 — SIGNIFICANT CHANGE UP
EOSINOPHIL # BLD AUTO: 0 K/UL — SIGNIFICANT CHANGE UP (ref 0–0.5)
EOSINOPHIL NFR BLD AUTO: 0 % — SIGNIFICANT CHANGE UP (ref 0–6)
ESTIMATED AVERAGE GLUCOSE: 120 MG/DL — HIGH (ref 68–114)
GLUCOSE SERPL-MCNC: 93 MG/DL — SIGNIFICANT CHANGE UP (ref 70–99)
HCT VFR BLD CALC: 45.9 % — HIGH (ref 34.5–45)
HGB BLD-MCNC: 14.9 G/DL — SIGNIFICANT CHANGE UP (ref 11.5–15.5)
IMM GRANULOCYTES NFR BLD AUTO: 0.3 % — SIGNIFICANT CHANGE UP (ref 0–1.5)
INR BLD: 1.07 RATIO — SIGNIFICANT CHANGE UP (ref 0.88–1.16)
LYMPHOCYTES # BLD AUTO: 1.55 K/UL — SIGNIFICANT CHANGE UP (ref 1–3.3)
LYMPHOCYTES # BLD AUTO: 23.4 % — SIGNIFICANT CHANGE UP (ref 13–44)
MCHC RBC-ENTMCNC: 30.9 PG — SIGNIFICANT CHANGE UP (ref 27–34)
MCHC RBC-ENTMCNC: 32.5 GM/DL — SIGNIFICANT CHANGE UP (ref 32–36)
MCV RBC AUTO: 95.2 FL — SIGNIFICANT CHANGE UP (ref 80–100)
MONOCYTES # BLD AUTO: 0.75 K/UL — SIGNIFICANT CHANGE UP (ref 0–0.9)
MONOCYTES NFR BLD AUTO: 11.3 % — SIGNIFICANT CHANGE UP (ref 2–14)
NEUTROPHILS # BLD AUTO: 4.23 K/UL — SIGNIFICANT CHANGE UP (ref 1.8–7.4)
NEUTROPHILS NFR BLD AUTO: 64.1 % — SIGNIFICANT CHANGE UP (ref 43–77)
PLATELET # BLD AUTO: 190 K/UL — SIGNIFICANT CHANGE UP (ref 150–400)
POTASSIUM SERPL-MCNC: 4.7 MMOL/L — SIGNIFICANT CHANGE UP (ref 3.5–5.3)
POTASSIUM SERPL-SCNC: 4.7 MMOL/L — SIGNIFICANT CHANGE UP (ref 3.5–5.3)
PROTHROM AB SERPL-ACNC: 12.4 SEC — SIGNIFICANT CHANGE UP (ref 10.5–13.4)
RBC # BLD: 4.82 M/UL — SIGNIFICANT CHANGE UP (ref 3.8–5.2)
RBC # FLD: 13.6 % — SIGNIFICANT CHANGE UP (ref 10.3–14.5)
SODIUM SERPL-SCNC: 140 MMOL/L — SIGNIFICANT CHANGE UP (ref 135–145)
WBC # BLD: 6.61 K/UL — SIGNIFICANT CHANGE UP (ref 3.8–10.5)
WBC # FLD AUTO: 6.61 K/UL — SIGNIFICANT CHANGE UP (ref 3.8–10.5)

## 2022-06-02 PROCEDURE — G0463: CPT

## 2022-06-02 RX ORDER — LAMOTRIGINE 25 MG/1
1 TABLET, ORALLY DISINTEGRATING ORAL
Qty: 0 | Refills: 0 | DISCHARGE

## 2022-06-02 NOTE — H&P PST ADULT - PROBLEM SELECTOR PLAN 5
Caprini score 7- High risk  Surgical team assess/recommend mechanical/pharmacological measures for VTE prophylaxis /80 today at PST, continue BP medication as prescribed, take metoprolol and amlodipine DOP. Medical and cardiac eval pending

## 2022-06-02 NOTE — H&P PST ADULT - PROBLEM SELECTOR PLAN 3
Follows with cardiologist  Will hold ASA as per cardiologist's instruction Take Synthroid AM of procedure, medical evaluation pending

## 2022-06-02 NOTE — H&P PST ADULT - PROBLEM SELECTOR PLAN 7
Caprini Score 7, High risk,  Surgical team should assess /Strongly recommend pharmacological and mechanical measures for VTE prophylaxis

## 2022-06-02 NOTE — H&P PST ADULT - PROBLEM SELECTOR PLAN 2
Follows with PCP  Will continue medications as prescribed Cardiac evaluation pending  discuss aspirin with cardiologist, verbalized understanding

## 2022-06-02 NOTE — H&P PST ADULT - ASSESSMENT
63 y/o female scheduled for anterior cervical discectomy and fusion with instrumentation C5-C6 with Dr. Santosh Mclean on 2022.  -Medical evaluation pending  - Cardiac cleared in chart  -verbalized understanding of all PST instructions including infection prevention  -Nothing to eat or chew after midnight the night before except meds  - Will complete fluid protocol  -Percocet as ordered for pain if needed  -Covid swab pending     OPIOID RISK TOOL    ISIDRA EACH BOX THAT APPLIES AND ADD TOTALS AT THE END    FAMILY HISTORY OF SUBSTANCE ABUSE                 FEMALE         MALE                                                Alcohol                             [  ]1 pt          [  ]3pts                                               Illegal Durgs                     [  ]2 pts        [  ]3pts                                               Rx Drugs                           [  ]4 pts        [  ]4 pts    PERSONAL HISTORY OF SUBSTANCE ABUSE                                                                                          Alcohol                             [  ]3 pts       [  ]3 pts                                               Illegal Drugs                     [  ]4 pts        [  ]4 pts                                               Rx Drugs                           [  ]5 pts        [  ]5 pts    AGE BETWEEN 16-45 YEARS                                      [  ]1 pt         [  ]1 pt    HISTORY OF PREADOLESCENT   SEXUAL ABUSE                                                             [  ]3 pts        [  ]0pts    PSYCHOLOGICAL DISEASE                     ADD, OCD, Bipolar, Schizophrenia        [  ]2 pts         [  ]2 pts                      Depression                                               [  ]1 pt           [  ]1 pt           SCORING TOTAL   (add numbers and type here)              (0)                                     A score of 3 or lower indicated LOW risk for future opioid abuse  A score of 4 to 7 indicated moderate risk for future opioid abuse  A score of 8 or higher indicates a high risk for opioid abuse    CAPRINI SCORE [CLOT]    AGE RELATED RISK FACTORS                                                       MOBILITY RELATED FACTORS  [ ] Age 41-60 years                                            (1 Point)                  [ ] Bed rest                                                        (1 Point)  [x ] Age: 61-74 years                                           (2 Points)                 [ ] Plaster cast                                                   (2 Points)  [ ] Age= 75 years                                              (3 Points)                 [ ] Bed bound for more than 72 hours                 (2 Points)    DISEASE RELATED RISK FACTORS                                               GENDER SPECIFIC FACTORS  [x ] Edema in the lower extremities                       (1 Point)                  [ ] Pregnancy                                                     (1 Point)  [ ] Varicose veins                                               (1 Point)                  [ ] Post-partum < 6 weeks                                   (1 Point)             [ x] BMI > 25 Kg/m2                                            (1 Point)                  [ ] Hormonal therapy  or oral contraception          (1 Point)                 [ ] Sepsis (in the previous month)                        (1 Point)                  [ ] History of pregnancy complications                 (1 point)  [ ] Pneumonia or serious lung disease                                               [ ] Unexplained or recurrent                     (1 Point)           (in the previous month)                               (1 Point)  [ ] Abnormal pulmonary function test                     (1 Point)                 SURGERY RELATED RISK FACTORS  [ ] Acute myocardial infarction                              (1 Point)                 [ ]  Section                                             (1 Point)  [ ] Congestive heart failure (in the previous month)  (1 Point)               [ ] Minor surgery                                                  (1 Point)   [ ] Inflammatory bowel disease                             (1 Point)                 [ ] Arthroscopic surgery                                        (2 Points)  [ ] Central venous access                                      (2 Points)                [x ] General surgery lasting more than 45 minutes   (2 Points)       [ ] Stroke (in the previous month)                          (5 Points)               [ ] Elective arthroplasty                                         (5 Points)                                                                                                                                               HEMATOLOGY RELATED FACTORS                                                 TRAUMA RELATED RISK FACTORS  [ ] Prior episodes of VTE                                     (3 Points)                [ ] Fracture of the hip, pelvis, or leg                       (5 Points)  [ ] Positive family history for VTE                         (3 Points)                 [ ] Acute spinal cord injury (in the previous month)  (5 Points)  [ ] Prothrombin 85121 A                                     (3 Points)                 [ ] Paralysis  (less than 1 month)                             (5 Points)  [ ] Factor V Leiden                                             (3 Points)                  [ ] Multiple Trauma within 1 month                        (5 Points)  [ ] Lupus anticoagulants                                     (3 Points)                                                           [ ] Anticardiolipin antibodies                               (3 Points)                                                       [ ] High homocysteine in the blood                      (3 Points)                                             [ ] Other congenital or acquired thrombophilia      (3 Points)                                                [ ] Heparin induced thrombocytopenia                  (3 Points)                                          Total Score [      7    ]    Caprini Score 0 - 2:  Low Risk, No VTE Prophylaxis required for most patients, encourage ambulation  Caprini Score 3 - 6:  At Risk, pharmacologic VTE prophylaxis is indicated for most patients (in the absence of a contraindication)  Caprini Score Greater than or = 7:  High Risk, pharmacologic VTE prophylaxis is indicated for most patients (in the absence of a contraindication) 62 year old female with a pmhx of pacemaker, sick sinus syndrome, asthma last used rescue inhalers 4 days ago denies any hospitalizations related to asthma, HTN, seizures last one 7 years ago on keppra and Lamictal hypothyroidism, breast cancer s/p left lumpectomy did not require chemotherapy.  Presents to PST today with complaints of neck pain for years, history of ACDF C6-C7 in  with good results, however now presents with recurrent neck pain.  Patient describes neck pain as constant, sharp, dull at times,  6/10 in severity, worse with forward flexion and extension, relief with flexeril and percocet, has tried steroid injections and physical therapy without relief, intermittent tingling to right arm and hand, pt states she constantly drops items. Patient is now scheduled for anterior cervical discectomy and fusion C6-C7 with Dr Frost on 22. Patient educated on surgical scrub, COVID testing, preadmission instructions, medical, cardiac, neurology clearance and day of procedure medications, verbalizes understanding. Pt instructed to stop vitamins/supplements/herbal medications/NSAIDS for one week prior to surgery and discuss with PMD. Spine booklet provided, ERP protocol reviewed, MSSA/MRSA swab done in pst, results pending.  Asked the patient to consult with PCP/cardiologist about holding ASA and the pt  agreed.     OPIOID RISK TOOL    ISIDRA EACH BOX THAT APPLIES AND ADD TOTALS AT THE END    FAMILY HISTORY OF SUBSTANCE ABUSE                 FEMALE         MALE                                                Alcohol                             [  ]1 pt          [  ]3pts                                               Illegal Durgs                     [  ]2 pts        [  ]3pts                                               Rx Drugs                           [  ]4 pts        [  ]4 pts    PERSONAL HISTORY OF SUBSTANCE ABUSE                                                                                          Alcohol                             [  ]3 pts       [  ]3 pts                                               Illegal Drugs                     [  ]4 pts        [  ]4 pts                                               Rx Drugs                           [  ]5 pts        [  ]5 pts    AGE BETWEEN 16-45 YEARS                                      [  ]1 pt         [  ]1 pt    HISTORY OF PREADOLESCENT   SEXUAL ABUSE                                                             [  ]3 pts        [  ]0pts    PSYCHOLOGICAL DISEASE                     ADD, OCD, Bipolar, Schizophrenia        [  ]2 pts         [  ]2 pts                      Depression                                               [  ]1 pt           [  ]1 pt           SCORING TOTAL   0                                   A score of 3 or lower indicated LOW risk for future opioid abuse  A score of 4 to 7 indicated moderate risk for future opioid abuse  A score of 8 or higher indicates a high risk for opioid abuse    CAPRINI SCORE    AGE RELATED RISK FACTORS                                                             [ ] Age 41-60 years                                            (1 Point)  [x ] Age: 61-74 years                                           (2 Points)                 [ ] Age= 75 years                                                (3 Points)             DISEASE RELATED RISK FACTORS                                                       [x ] Edema in the lower extremities                 (1 Point)                     [ ] Varicose veins                                               (1 Point)                                 [x ] BMI > 25 Kg/m2                                            (1 Point)                                  [ ] Serious infection (ie PNA)                            (1 Point)                     [ ] Lung disease ( COPD, Emphysema)            (1 Point)                                                                          [ ] Acute myocardial infarction                         (1 Point)                  [ ] Congestive heart failure (in the previous month)  (1 Point)         [ ] Inflammatory bowel disease                            (1 Point)                  [ ] Central venous access, PICC or Port               (2 points)       (within the last month)                                                                [ ] Stroke (in the previous month)                        (5 Points)    [ ] Previous or present malignancy                       (2 points)                                                                                                                                                         HEMATOLOGY RELATED FACTORS                                                         [ ] Prior episodes of VTE                                     (3 Points)                     [ ] Positive family history for VTE                      (3 Points)                  [ ] Prothrombin 91635 A                                     (3 Points)                     [ ] Factor V Leiden                                                (3 Points)                        [ ] Lupus anticoagulants                                      (3 Points)                                                           [ ] Anticardiolipin antibodies                              (3 Points)                                                       [ ] High homocysteine in the blood                   (3 Points)                                             [ ] Other congenital or acquired thrombophilia      (3 Points)                                                [ ] Heparin induced thrombocytopenia                  (3 Points)                                        MOBILITY RELATED FACTORS  [ ] Bed rest                                                         (1 Point)  [ ] Plaster cast                                                    (2 points)  [ ] Bed bound for more than 72 hours           (2 Points)    GENDER SPECIFIC FACTORS  [ ] Pregnancy or had a baby within the last month   (1 Point)  [ ] Post-partum < 6 weeks                                   (1 Point)  [ ] Hormonal therapy  or oral contraception   (1 Point)  [ ] History of pregnancy complications              (1 point)  [ ] Unexplained or recurrent              (1 Point)    OTHER RISK FACTORS                                           (1 Point)  [ x] BMI >40, smoking, diabetes requiring insulin, chemotherapy  blood transfusions and length of surgery over 2 hours    SURGERY RELATED RISK FACTORS  [ ]  Section within the last month     (1 Point)  [ ] Minor surgery                                                  (1 Point)  [ ] Arthroscopic surgery                                       (2 Points)  [x ] Planned major surgery lasting more            (2 Points)      than 45 minutes     [ ] Elective hip or knee joint replacement       (5 points)       surgery                                                TRAUMA RELATED RISK FACTORS  [ ] Fracture of the hip, pelvis, or leg                       (5 Points)  [ ] Spinal cord injury resulting in paralysis             (5 points)       (in the previous month)    [ ] Paralysis  (less than 1 month)                             (5 Points)  [ ] Multiple Trauma within 1 month                        (5 Points)    Total Score [    7    ]    Caprini Score 0-2: Low Risk, NO VTE prophylaxis required for most patients, encourage ambulation  Caprini Score 3-6: Moderate Risk , pharmacologic VTE prophylaxis is indicated for most patients (in the absence of contraindications)  Caprini Score Greater than or =7: High risk, pharmocologic VTE prophylaxis indicated for most patients (in the absence of contraindications)

## 2022-06-02 NOTE — H&P PST ADULT - NECK DETAILS
no carotid bruit bilaterally, decreased ROM due to pain with right and left rotation, flexion and extension/supple/no JVD/normal thyroid gland

## 2022-06-02 NOTE — H&P PST ADULT - PROBLEM SELECTOR PLAN 1
Scheduled for anterior cervical discectomy and fusion with instrumentation C5-C6 with Dr. Santosh Mclean on 4/21/2022.  -Medical evaluation pending  - Cardiac cleared in chart  -verbalized understanding of all PST instructions including infection prevention  -Nothing to eat or chew after midnight the night before except meds  - Will complete fluid protocol  -Percocet as ordered for pain if needed  -Covid swab pending Patient is now scheduled for anterior cervical discectomy and fusion C6-C7 with Dr Frost on 6/16/22.  medical, cardiac and neurology eval pending

## 2022-06-02 NOTE — H&P PST ADULT - NEGATIVE NEUROLOGICAL SYMPTOMS
no transient paralysis/no focal seizures/no syncope/no tremors/no vertigo/no headache/no loss of consciousness/no hemiparesis/no confusion

## 2022-06-02 NOTE — H&P PST ADULT - HISTORY OF PRESENT ILLNESS
62 year old female with a pmhx of pacemaker, asthma last used rescue inhalers 4 days ago denies any hospitalizations related to asthma, HTN, seizures last one 7 years ago on keppra and lamictal, hypothyroidism, breast cancer s/p left lumpectomy did not require chemotherapy.  Presents to PST today with complaints of neck pain for years, history of ACDF C6-C7 in 2002 with good results, however now presents with recurrent neck pain.  Patient describes neck pain as constant, sharp, dull at times,  6/10 in severity, worse with forward flexion and extension, relief with flexeril and percocet, has tried steroid injections and physical therapy without relief, intermitent tingling to right arm and hand, pt states she constantly drops items. Patient is now scheduled for anterior cervical discectomy and fusion C6-C7 with Dr Frost on 6/16/22.  Medical, cardiac, and neurology clearance   62 year old female with a pmhx of pacemaker, sick sinus syndrome, asthma last used rescue inhalers 4 days ago denies any hospitalizations related to asthma, HTN, seizures last one 7 years ago on keppra and lamictal, hypothyroidism, breast cancer s/p left lumpectomy did not require chemotherapy.  Presents to PST today with complaints of neck pain for years, history of ACDF C6-C7 in 2002 with good results, however now presents with recurrent neck pain.  Patient describes neck pain as constant, sharp, dull at times,  6/10 in severity, worse with forward flexion and extension, relief with flexeril and percocet, has tried steroid injections and physical therapy without relief, intermitent tingling to right arm and hand, pt states she constantly drops items. Patient is now scheduled for anterior cervical discectomy and fusion C6-C7 with Dr Frost on 6/16/22.  Medical, cardiac, and neurology clearance

## 2022-06-02 NOTE — H&P PST ADULT - NEGATIVE CARDIOVASCULAR SYMPTOMS
Pacemaker/no chest pain/no palpitations/no orthopnea/no paroxysmal nocturnal dyspnea/no claudication

## 2022-06-02 NOTE — H&P PST ADULT - NSICDXPASTMEDICALHX_GEN_ALL_CORE_FT
PAST MEDICAL HISTORY:  Arthritis     Asthma last used rescue inhaler 4 days ago 5/30/22    At risk for sleep apnea Bang score 4    Breast CA left    CAD (coronary artery disease)     Cervical spinal stenosis     Hypertension     Hypothyroidism     Seizure last seizure 7 years ago    Spinal stenosis lumbar

## 2022-06-03 LAB
MRSA PCR RESULT.: SIGNIFICANT CHANGE UP
S AUREUS DNA NOSE QL NAA+PROBE: DETECTED

## 2022-06-15 ENCOUNTER — TRANSCRIPTION ENCOUNTER (OUTPATIENT)
Age: 62
End: 2022-06-15

## 2022-06-15 NOTE — PATIENT PROFILE ADULT - FALL HARM RISK - HARM RISK INTERVENTIONS

## 2022-06-16 ENCOUNTER — APPOINTMENT (OUTPATIENT)
Dept: ORTHOPEDIC SURGERY | Facility: HOSPITAL | Age: 62
End: 2022-06-16

## 2022-06-16 ENCOUNTER — TRANSCRIPTION ENCOUNTER (OUTPATIENT)
Age: 62
End: 2022-06-16

## 2022-06-16 ENCOUNTER — INPATIENT (INPATIENT)
Facility: HOSPITAL | Age: 62
LOS: 3 days | Discharge: HOME | DRG: 472 | End: 2022-06-20
Attending: SPECIALIST | Admitting: SPECIALIST
Payer: MEDICARE

## 2022-06-16 VITALS
SYSTOLIC BLOOD PRESSURE: 143 MMHG | TEMPERATURE: 98 F | WEIGHT: 293 LBS | HEIGHT: 65 IN | DIASTOLIC BLOOD PRESSURE: 91 MMHG | HEART RATE: 85 BPM | OXYGEN SATURATION: 97 %

## 2022-06-16 DIAGNOSIS — Z90.49 ACQUIRED ABSENCE OF OTHER SPECIFIED PARTS OF DIGESTIVE TRACT: Chronic | ICD-10-CM

## 2022-06-16 DIAGNOSIS — Z90.710 ACQUIRED ABSENCE OF BOTH CERVIX AND UTERUS: Chronic | ICD-10-CM

## 2022-06-16 DIAGNOSIS — Z98.1 ARTHRODESIS STATUS: Chronic | ICD-10-CM

## 2022-06-16 DIAGNOSIS — M48.02 SPINAL STENOSIS, CERVICAL REGION: ICD-10-CM

## 2022-06-16 DIAGNOSIS — Z98.49 CATARACT EXTRACTION STATUS, UNSPECIFIED EYE: Chronic | ICD-10-CM

## 2022-06-16 DIAGNOSIS — Z98.890 OTHER SPECIFIED POSTPROCEDURAL STATES: Chronic | ICD-10-CM

## 2022-06-16 DIAGNOSIS — Z95.0 PRESENCE OF CARDIAC PACEMAKER: Chronic | ICD-10-CM

## 2022-06-16 LAB
GLUCOSE BLDC GLUCOMTR-MCNC: 111 MG/DL — HIGH (ref 70–99)
GLUCOSE BLDC GLUCOMTR-MCNC: 133 MG/DL — HIGH (ref 70–99)

## 2022-06-16 PROCEDURE — 22551 ARTHRD ANT NTRBDY CERVICAL: CPT | Mod: 62

## 2022-06-16 PROCEDURE — 20931 SP BONE ALGRFT STRUCT ADD-ON: CPT

## 2022-06-16 PROCEDURE — 22845 INSERT SPINE FIXATION DEVICE: CPT

## 2022-06-16 PROCEDURE — 22845 INSERT SPINE FIXATION DEVICE: CPT | Mod: 80

## 2022-06-16 PROCEDURE — 99222 1ST HOSP IP/OBS MODERATE 55: CPT

## 2022-06-16 DEVICE — IMPLANTABLE DEVICE: Type: IMPLANTABLE DEVICE | Status: FUNCTIONAL

## 2022-06-16 DEVICE — PIN DISTRACTION ST TI 12MM BL: Type: IMPLANTABLE DEVICE | Status: FUNCTIONAL

## 2022-06-16 DEVICE — PIN DISTRACTION ST TI 14MM YELLOW: Type: IMPLANTABLE DEVICE | Status: FUNCTIONAL

## 2022-06-16 DEVICE — SEALANT FLOSEAL FAST PREP HEMOSTATIC MATRIX 10ML: Type: IMPLANTABLE DEVICE | Status: FUNCTIONAL

## 2022-06-16 DEVICE — SURGIFOAM PAD SZ 100: Type: IMPLANTABLE DEVICE | Status: FUNCTIONAL

## 2022-06-16 DEVICE — PLATE 2 LVL 38MM: Type: IMPLANTABLE DEVICE | Status: FUNCTIONAL

## 2022-06-16 RX ORDER — ASPIRIN/CALCIUM CARB/MAGNESIUM 324 MG
1 TABLET ORAL
Qty: 0 | Refills: 0 | DISCHARGE

## 2022-06-16 RX ORDER — HYDROMORPHONE HYDROCHLORIDE 2 MG/ML
0.5 INJECTION INTRAMUSCULAR; INTRAVENOUS; SUBCUTANEOUS
Refills: 0 | Status: DISCONTINUED | OUTPATIENT
Start: 2022-06-16 | End: 2022-06-16

## 2022-06-16 RX ORDER — LEVETIRACETAM 250 MG/1
500 TABLET, FILM COATED ORAL
Refills: 0 | Status: DISCONTINUED | OUTPATIENT
Start: 2022-06-16 | End: 2022-06-20

## 2022-06-16 RX ORDER — ACETAMINOPHEN 500 MG
975 TABLET ORAL ONCE
Refills: 0 | Status: COMPLETED | OUTPATIENT
Start: 2022-06-16 | End: 2022-06-16

## 2022-06-16 RX ORDER — LEVETIRACETAM 250 MG/1
1 TABLET, FILM COATED ORAL
Qty: 0 | Refills: 0 | DISCHARGE

## 2022-06-16 RX ORDER — POTASSIUM GLUCONATE 2.5 MEQ
0 TABLET ORAL
Qty: 0 | Refills: 0 | DISCHARGE

## 2022-06-16 RX ORDER — LAMOTRIGINE 25 MG/1
1 TABLET, ORALLY DISINTEGRATING ORAL
Qty: 0 | Refills: 0 | DISCHARGE

## 2022-06-16 RX ORDER — HYDROMORPHONE HYDROCHLORIDE 2 MG/ML
2 INJECTION INTRAMUSCULAR; INTRAVENOUS; SUBCUTANEOUS EVERY 4 HOURS
Refills: 0 | Status: DISCONTINUED | OUTPATIENT
Start: 2022-06-16 | End: 2022-06-20

## 2022-06-16 RX ORDER — MAGNESIUM OXIDE 400 MG ORAL TABLET 241.3 MG
1 TABLET ORAL
Qty: 0 | Refills: 0 | DISCHARGE

## 2022-06-16 RX ORDER — CYCLOBENZAPRINE HYDROCHLORIDE 10 MG/1
1 TABLET, FILM COATED ORAL
Qty: 0 | Refills: 0 | DISCHARGE

## 2022-06-16 RX ORDER — LAMOTRIGINE 25 MG/1
100 TABLET, ORALLY DISINTEGRATING ORAL
Refills: 0 | Status: DISCONTINUED | OUTPATIENT
Start: 2022-06-16 | End: 2022-06-16

## 2022-06-16 RX ORDER — ASCORBIC ACID 60 MG
500 TABLET,CHEWABLE ORAL
Refills: 0 | Status: DISCONTINUED | OUTPATIENT
Start: 2022-06-16 | End: 2022-06-20

## 2022-06-16 RX ORDER — EPINEPHRINE 0.3 MG/.3ML
0.9 INJECTION INTRAMUSCULAR; SUBCUTANEOUS ONCE
Refills: 0 | Status: DISCONTINUED | OUTPATIENT
Start: 2022-06-16 | End: 2022-06-20

## 2022-06-16 RX ORDER — METOCLOPRAMIDE HCL 10 MG
10 TABLET ORAL EVERY 6 HOURS
Refills: 0 | Status: DISCONTINUED | OUTPATIENT
Start: 2022-06-16 | End: 2022-06-20

## 2022-06-16 RX ORDER — PREGABALIN 225 MG/1
1 CAPSULE ORAL
Qty: 0 | Refills: 0 | DISCHARGE

## 2022-06-16 RX ORDER — AMLODIPINE BESYLATE 2.5 MG/1
2.5 TABLET ORAL DAILY
Refills: 0 | Status: DISCONTINUED | OUTPATIENT
Start: 2022-06-16 | End: 2022-06-20

## 2022-06-16 RX ORDER — AMLODIPINE BESYLATE 2.5 MG/1
1 TABLET ORAL
Qty: 0 | Refills: 0 | DISCHARGE

## 2022-06-16 RX ORDER — SODIUM CHLORIDE 9 MG/ML
1000 INJECTION, SOLUTION INTRAVENOUS
Refills: 0 | Status: DISCONTINUED | OUTPATIENT
Start: 2022-06-16 | End: 2022-06-16

## 2022-06-16 RX ORDER — PANTOPRAZOLE SODIUM 20 MG/1
40 TABLET, DELAYED RELEASE ORAL
Refills: 0 | Status: DISCONTINUED | OUTPATIENT
Start: 2022-06-16 | End: 2022-06-20

## 2022-06-16 RX ORDER — SODIUM CHLORIDE 9 MG/ML
3 INJECTION INTRAMUSCULAR; INTRAVENOUS; SUBCUTANEOUS EVERY 8 HOURS
Refills: 0 | Status: DISCONTINUED | OUTPATIENT
Start: 2022-06-16 | End: 2022-06-16

## 2022-06-16 RX ORDER — OXYCODONE HYDROCHLORIDE 5 MG/1
10 TABLET ORAL EVERY 4 HOURS
Refills: 0 | Status: DISCONTINUED | OUTPATIENT
Start: 2022-06-16 | End: 2022-06-20

## 2022-06-16 RX ORDER — SENNA PLUS 8.6 MG/1
2 TABLET ORAL AT BEDTIME
Refills: 0 | Status: DISCONTINUED | OUTPATIENT
Start: 2022-06-16 | End: 2022-06-20

## 2022-06-16 RX ORDER — METOPROLOL TARTRATE 50 MG
1 TABLET ORAL
Qty: 0 | Refills: 0 | DISCHARGE

## 2022-06-16 RX ORDER — LAMOTRIGINE 25 MG/1
250 TABLET, ORALLY DISINTEGRATING ORAL
Refills: 0 | Status: DISCONTINUED | OUTPATIENT
Start: 2022-06-16 | End: 2022-06-20

## 2022-06-16 RX ORDER — CHOLECALCIFEROL (VITAMIN D3) 125 MCG
2000 CAPSULE ORAL
Qty: 0 | Refills: 0 | DISCHARGE

## 2022-06-16 RX ORDER — PREGABALIN 225 MG/1
500 CAPSULE ORAL DAILY
Refills: 0 | Status: DISCONTINUED | OUTPATIENT
Start: 2022-06-16 | End: 2022-06-20

## 2022-06-16 RX ORDER — ALBUTEROL 90 UG/1
2 AEROSOL, METERED ORAL EVERY 6 HOURS
Refills: 0 | Status: DISCONTINUED | OUTPATIENT
Start: 2022-06-16 | End: 2022-06-20

## 2022-06-16 RX ORDER — ATORVASTATIN CALCIUM 80 MG/1
1 TABLET, FILM COATED ORAL
Qty: 0 | Refills: 0 | DISCHARGE

## 2022-06-16 RX ORDER — ATORVASTATIN CALCIUM 80 MG/1
20 TABLET, FILM COATED ORAL AT BEDTIME
Refills: 0 | Status: DISCONTINUED | OUTPATIENT
Start: 2022-06-16 | End: 2022-06-20

## 2022-06-16 RX ORDER — MAGNESIUM OXIDE 400 MG ORAL TABLET 241.3 MG
400 TABLET ORAL DAILY
Refills: 0 | Status: DISCONTINUED | OUTPATIENT
Start: 2022-06-16 | End: 2022-06-20

## 2022-06-16 RX ORDER — METOPROLOL TARTRATE 50 MG
50 TABLET ORAL DAILY
Refills: 0 | Status: DISCONTINUED | OUTPATIENT
Start: 2022-06-16 | End: 2022-06-20

## 2022-06-16 RX ORDER — LEVOTHYROXINE SODIUM 125 MCG
100 TABLET ORAL DAILY
Refills: 0 | Status: DISCONTINUED | OUTPATIENT
Start: 2022-06-16 | End: 2022-06-20

## 2022-06-16 RX ORDER — DIPHENHYDRAMINE HCL 50 MG
25 CAPSULE ORAL DAILY
Refills: 0 | Status: DISCONTINUED | OUTPATIENT
Start: 2022-06-16 | End: 2022-06-20

## 2022-06-16 RX ORDER — LAMOTRIGINE 25 MG/1
150 TABLET, ORALLY DISINTEGRATING ORAL
Refills: 0 | Status: DISCONTINUED | OUTPATIENT
Start: 2022-06-16 | End: 2022-06-16

## 2022-06-16 RX ORDER — LEVOTHYROXINE SODIUM 125 MCG
1 TABLET ORAL
Qty: 0 | Refills: 0 | DISCHARGE

## 2022-06-16 RX ORDER — HYDROMORPHONE HYDROCHLORIDE 2 MG/ML
1 INJECTION INTRAMUSCULAR; INTRAVENOUS; SUBCUTANEOUS
Refills: 0 | Status: DISCONTINUED | OUTPATIENT
Start: 2022-06-16 | End: 2022-06-16

## 2022-06-16 RX ORDER — FOLIC ACID 0.8 MG
1 TABLET ORAL
Qty: 0 | Refills: 0 | DISCHARGE

## 2022-06-16 RX ORDER — APREPITANT 80 MG/1
40 CAPSULE ORAL ONCE
Refills: 0 | Status: COMPLETED | OUTPATIENT
Start: 2022-06-16 | End: 2022-06-16

## 2022-06-16 RX ORDER — FOLIC ACID 0.8 MG
1 TABLET ORAL DAILY
Refills: 0 | Status: DISCONTINUED | OUTPATIENT
Start: 2022-06-16 | End: 2022-06-20

## 2022-06-16 RX ORDER — ONDANSETRON 8 MG/1
4 TABLET, FILM COATED ORAL ONCE
Refills: 0 | Status: DISCONTINUED | OUTPATIENT
Start: 2022-06-16 | End: 2022-06-16

## 2022-06-16 RX ORDER — CHOLECALCIFEROL (VITAMIN D3) 125 MCG
2000 CAPSULE ORAL DAILY
Refills: 0 | Status: DISCONTINUED | OUTPATIENT
Start: 2022-06-16 | End: 2022-06-20

## 2022-06-16 RX ORDER — OXYCODONE HYDROCHLORIDE 5 MG/1
5 TABLET ORAL EVERY 4 HOURS
Refills: 0 | Status: DISCONTINUED | OUTPATIENT
Start: 2022-06-16 | End: 2022-06-20

## 2022-06-16 RX ORDER — CYCLOBENZAPRINE HYDROCHLORIDE 10 MG/1
10 TABLET, FILM COATED ORAL THREE TIMES A DAY
Refills: 0 | Status: DISCONTINUED | OUTPATIENT
Start: 2022-06-16 | End: 2022-06-20

## 2022-06-16 RX ADMIN — OXYCODONE HYDROCHLORIDE 10 MILLIGRAM(S): 5 TABLET ORAL at 13:55

## 2022-06-16 RX ADMIN — SENNA PLUS 2 TABLET(S): 8.6 TABLET ORAL at 22:33

## 2022-06-16 RX ADMIN — HYDROMORPHONE HYDROCHLORIDE 2 MILLIGRAM(S): 2 INJECTION INTRAMUSCULAR; INTRAVENOUS; SUBCUTANEOUS at 23:50

## 2022-06-16 RX ADMIN — ATORVASTATIN CALCIUM 20 MILLIGRAM(S): 80 TABLET, FILM COATED ORAL at 22:33

## 2022-06-16 RX ADMIN — HYDROMORPHONE HYDROCHLORIDE 0.5 MILLIGRAM(S): 2 INJECTION INTRAMUSCULAR; INTRAVENOUS; SUBCUTANEOUS at 13:15

## 2022-06-16 RX ADMIN — LEVETIRACETAM 500 MILLIGRAM(S): 250 TABLET, FILM COATED ORAL at 18:37

## 2022-06-16 RX ADMIN — Medication 500 MILLIGRAM(S): at 18:37

## 2022-06-16 RX ADMIN — LAMOTRIGINE 250 MILLIGRAM(S): 25 TABLET, ORALLY DISINTEGRATING ORAL at 18:37

## 2022-06-16 RX ADMIN — Medication 100 MILLIGRAM(S): at 18:37

## 2022-06-16 RX ADMIN — HYDROMORPHONE HYDROCHLORIDE 0.5 MILLIGRAM(S): 2 INJECTION INTRAMUSCULAR; INTRAVENOUS; SUBCUTANEOUS at 13:05

## 2022-06-16 RX ADMIN — OXYCODONE HYDROCHLORIDE 10 MILLIGRAM(S): 5 TABLET ORAL at 18:46

## 2022-06-16 RX ADMIN — OXYCODONE HYDROCHLORIDE 10 MILLIGRAM(S): 5 TABLET ORAL at 19:46

## 2022-06-16 RX ADMIN — HYDROMORPHONE HYDROCHLORIDE 2 MILLIGRAM(S): 2 INJECTION INTRAMUSCULAR; INTRAVENOUS; SUBCUTANEOUS at 22:46

## 2022-06-16 RX ADMIN — Medication 975 MILLIGRAM(S): at 06:36

## 2022-06-16 RX ADMIN — APREPITANT 40 MILLIGRAM(S): 80 CAPSULE ORAL at 06:36

## 2022-06-16 RX ADMIN — OXYCODONE HYDROCHLORIDE 10 MILLIGRAM(S): 5 TABLET ORAL at 13:20

## 2022-06-16 RX ADMIN — HYDROMORPHONE HYDROCHLORIDE 0.5 MILLIGRAM(S): 2 INJECTION INTRAMUSCULAR; INTRAVENOUS; SUBCUTANEOUS at 13:22

## 2022-06-16 NOTE — PHYSICAL THERAPY INITIAL EVALUATION ADULT - PERTINENT HX OF CURRENT PROBLEM, REHAB EVAL
pt present for elective surgery; describes neck pain as constant, sharp, dull at times, worse with forward flexion and extension

## 2022-06-16 NOTE — BRIEF OPERATIVE NOTE - OPERATION/FINDINGS
C5/6 ACDF  structural allograft  removal of hardware C6/7 (Prior ACDF)  anterior instrumentation C5-7

## 2022-06-16 NOTE — DISCHARGE NOTE PROVIDER - NSDCCPCAREPLAN_GEN_ALL_CORE_FT
PRINCIPAL DISCHARGE DIAGNOSIS  Diagnosis: Cervical spinal stenosis  Assessment and Plan of Treatment:        PRINCIPAL DISCHARGE DIAGNOSIS  Diagnosis: Cervical spinal stenosis  Assessment and Plan of Treatment:       SECONDARY DISCHARGE DIAGNOSES  Diagnosis: Urinary retention  Assessment and Plan of Treatment: Patient is to follow-up with Urologist Dr. Rehman or [facility] outpatient for catheter removal and trial of void within 1 week. Please call Dr. Kyle's office if there are any issues, questions or concerns.

## 2022-06-16 NOTE — DISCHARGE NOTE PROVIDER - HOSPITAL COURSE
Patient was admitted 6/16/2022 for elective Anterior cervical discectomy and fusion C5-C6. The hospital post operative course was uneventful.  The surgical dressing was changed and the drain was removed uneventfully. PT/OT worked with patient for gait training. Pain was now adequately controlled and patient was discharged home in stable condition.  Chronic medical conditions were treated during the hospital stay. Patient was admitted 6/16/2022 for elective Anterior cervical discectomy and fusion C5-C6. The hospital post operative course was uneventful.  The surgical dressing was changed and the drain was removed uneventfully. PT/OT worked with patient for gait training. Pain was now adequately controlled and patient was discharged home in stable condition.  Chronic medical conditions were treated during the hospital stay. Patient had mcbride catheter placed for urinary retention 6/18/22. Urology team was conferred with and recommended to follow-up with Dr. Seo or Gallo outpatient for mcbride catheter management. The patient was discharged in stable condition.

## 2022-06-16 NOTE — PHYSICAL THERAPY INITIAL EVALUATION ADULT - ADDITIONAL COMMENTS
as per pt: resides in the private house no steps to enter, ambulates with rollator, denies hx of falling,  available to assist as needed upon D/C home

## 2022-06-16 NOTE — DISCHARGE NOTE PROVIDER - NSDCFUADDINST_GEN_ALL_CORE_FT
* Office follow up in 7-10 Days. Please call office to set up appt upon DC.  * Patient may shower once drain is removed. Please change dressings daily after shower if they get wet. Do NOT soak in tub or pool.   * Patient may complete ADLs. No lifting greater than 2lbs. No aggressive PT. Patient may walk outdoors without exerting oneself.   * Patient will take Senna S or similar medication to help prevent narcotic induced constipation.   * Patient may resume home medications as prescribed unless noted.   * Complete medrol dose kenneth as prescibed   * Call office with any concerns. * Office follow up in 7-10 Days. Please call office to set up appt upon DC.  * Patient may shower once drain is removed. Please change dressings daily after shower if they get wet. Do NOT soak in tub or pool.   * Patient may complete ADLs. No lifting greater than 2lbs. No aggressive PT. Patient may walk outdoors without exerting oneself.   * Patient will take Senna S or similar medication to help prevent narcotic induced constipation.   * Patient may resume home medications as prescribed unless noted.   * Complete medrol dose kenneth as prescibed   * Call office with any concerns.  Patient is to follow-up with Urologist Dr. Rehman or [facility] outpatient for catheter removal and trial of void within 1 week. Please call Dr. Kyle's office if there are any issues, questions or concerns.

## 2022-06-16 NOTE — CONSULT NOTE ADULT - ASSESSMENT
62 year old female with a pmhx of pacemaker, sick sinus syndrome, asthma (denies any hospitalizations related to asthma), HTN, seizures last one 7 years ago on keppra and lamictal, hypothyroidism, breast cancer s/p left lumpectomy did not require chemotherapy or radiation 8-9years ago, presents with  neck pain for years, history of ACDF C6-C7 in 2002 with good results, however now presents with recurrent neck pain.  Patient describes neck pain as constant, sharp, dull at times,  6/10 in severity, worse with forward flexion and extension, relief with flexeril and percocet, has tried steroid injections and physical therapy without relief, intermittent tingling to right arm and hand, pt states she constantly drops items. Patient is now anterior cervical discectomy and fusion C6-C7 POD #0.    Cervical spine stenosis  S/p ACDF C6-C7 POD #0  Pain control - patient reports she follows with pain management outpatient.  PT.  Antibiotics, wound care, drain management and DVT prophylaxis as per primary team.  Incentive spirometry.  Avoid opioid induced constipation.    HTN  Continue Toprol and Amlodipine with parameters.    HLD  Continue statin.    Asthma  Patient reports she takes Albuterol prn.    Seizure  Continue Keppra and Lamictal.  Seizure precautions.    Hypothyroid  Continue Synthroid.    H/o Breast cancer  S/p lumpectomy 8-9years ago, didn't need chemo and RT.    DVT prophylaxis  SCDs

## 2022-06-16 NOTE — DISCHARGE NOTE PROVIDER - CARE PROVIDER_API CALL
Yg Frost)  Orthopaedic Surgery  [address]  [place] [address]  Phone: [phone]  Fax: [phone]  Follow Up Time:    Yg Frost)  Orthopaedic Surgery  [address]  [place] [address]  Phone: [phone]  Fax: [phone]  Follow Up Time:     Ernesto Rehman)  Urology  [id number] [place] [id number]  Phone: [phone]  Fax: [phone]  Follow Up Time:     Jhon Seo)  Urology  [address]  [place] [address]  Phone: [phone]  Fax: [phone]  Follow Up Time:

## 2022-06-16 NOTE — DISCHARGE NOTE PROVIDER - NSDCMRMEDTOKEN_GEN_ALL_CORE_FT
albuterol: 0.083 pad(s) inhaled 4 times a day, As Needed  amLODIPine 2.5 mg oral tablet: 1 tab(s) orally once a day  aspirin 81 mg oral delayed release tablet: 1 tab(s) orally once a day  atorvastatin 20 mg oral tablet: 1 tab(s) orally once a day  Benadryl 25 mg oral capsule:   EPINEPHrine: 0.9 milligram(s) injectable , As Needed  Flexeril 10 mg oral tablet: 1 tab(s) orally 3 times a day  folic acid 1 mg oral tablet: 1 tab(s) orally once a day  Keppra 500 mg oral tablet: 1 tab(s) orally 2 times a day  LaMICtal  mg oral tablet, extended release: 1 tab(s) orally 2 times a day  levothyroxine 100 mcg (0.1 mg) oral tablet: 1 tab(s) orally once a day  magnesium oxide 400 mg oral tablet: 1 tab(s) orally once a day  Metoprolol Succinate ER 50 mg oral tablet, extended release: 1 tab(s) orally once a day  mupirocin 2% topical ointment: Apply to both nostrils 2 times a day for 5 days prior to surgical procedure. Start on 4/16/22 and end 4/20/22, Please apply ointment after on the day of covid swab test.   Percocet 5/325 oral tablet: orally 3 times a day  potassium gluconate 595 mg (99 mg elemental potassium) oral tablet: orally once a day  Ventolin 90 mcg/inh inhalation aerosol:   Vitamin B12 500 mcg oral tablet: 1 tab(s) orally once a day  Vitamin D3: 2000 unit(s) orally once a day   albuterol: 0.083 pad(s) inhaled 4 times a day, As Needed  amLODIPine 2.5 mg oral tablet: 1 tab(s) orally once a day  aspirin 81 mg oral delayed release tablet: 1 tab(s) orally once a day  atorvastatin 20 mg oral tablet: 1 tab(s) orally once a day  Benadryl 25 mg oral capsule: orally once a day, As Needed  EPINEPHrine 100 mcg/mL-NaCl 0.9% intravenous solution: 9 milliliter(s) intravenous once, As needed, Allergic reaction  Flexeril 10 mg oral tablet: 1 tab(s) orally 3 times a day  folic acid 1 mg oral tablet: 1 tab(s) orally once a day  Keppra 500 mg oral tablet: 1 tab(s) orally 2 times a day  LaMICtal  mg oral tablet, extended release: 1 tab(s) orally 2 times a day  levothyroxine 100 mcg (0.1 mg) oral tablet: 1 tab(s) orally once a day  magnesium oxide 400 mg oral tablet: 1 tab(s) orally once a day  Metoprolol Succinate ER 50 mg oral tablet, extended release: 1 tab(s) orally once a day  oxyCODONE 5 mg oral tablet: 1 tab(s) orally every 4 hours, As needed, Pain  MDD:6  potassium gluconate 595 mg (99 mg elemental potassium) oral tablet: orally once a day  Senna S 50 mg-8.6 mg oral tablet: 2 tab(s) orally once a day (at bedtime)   tamsulosin 0.4 mg oral capsule: 1 cap(s) orally once a day (at bedtime)  Vitamin B12 500 mcg oral tablet: 1 tab(s) orally once a day  Vitamin D3: 2000 unit(s) orally once a day   albuterol: 0.083 pad(s) inhaled 4 times a day, As Needed  amLODIPine 2.5 mg oral tablet: 1 tab(s) orally once a day  aspirin 81 mg oral delayed release tablet: 1 tab(s) orally once a day  atorvastatin 20 mg oral tablet: 1 tab(s) orally once a day  Benadryl 25 mg oral capsule: orally once a day, As Needed  EPINEPHrine 100 mcg/mL-NaCl 0.9% intravenous solution: 9 milliliter(s) intravenous once, As needed, Allergic reaction  Flexeril 10 mg oral tablet: 1 tab(s) orally 3 times a day  folic acid 1 mg oral tablet: 1 tab(s) orally once a day  Keppra 500 mg oral tablet: 1 tab(s) orally 2 times a day  LaMICtal  mg oral tablet, extended release: 1 tab(s) orally 2 times a day  levothyroxine 100 mcg (0.1 mg) oral tablet: 1 tab(s) orally once a day  magnesium oxide 400 mg oral tablet: 1 tab(s) orally once a day  Medrol 2 mg oral tablet: Take as directed  Metoprolol Succinate ER 50 mg oral tablet, extended release: 1 tab(s) orally once a day  oxyCODONE 5 mg oral tablet: 1 tab(s) orally every 4 hours, As needed, Pain  MDD:6  potassium gluconate 595 mg (99 mg elemental potassium) oral tablet: orally once a day  Senna S 50 mg-8.6 mg oral tablet: 2 tab(s) orally once a day (at bedtime)   tamsulosin 0.4 mg oral capsule: 1 cap(s) orally once a day (at bedtime)  Vitamin B12 500 mcg oral tablet: 1 tab(s) orally once a day  Vitamin D3: 2000 unit(s) orally once a day

## 2022-06-16 NOTE — DISCHARGE NOTE PROVIDER - PROVIDER TOKENS
PROVIDER:[TOKEN:[[id number]]] PROVIDER:[TOKEN:[[id number]]],PROVIDER:[TOKEN:[[id number]]],PROVIDER:[TOKEN:[[id number]]]

## 2022-06-16 NOTE — BRIEF OPERATIVE NOTE - COMMENTS
Removal of hardware C6/7 - Atlantis plate ([employer])  New instrumentation ; Gricelda Richland 2 level 38mm, 12mm screws x6  structural allograft 8mm lordotic

## 2022-06-16 NOTE — CONSULT NOTE ADULT - NS ATTEND AMEND GEN_ALL_CORE FT
Patient seen and examined on PACU ,   tolerated procedure well ,   s/p ACDF   Vital Signs Last 24 Hrs  T(C): 36.6 (16 Jun 2022 15:19), Max: 36.9 (16 Jun 2022 06:22)  T(F): 97.8 (16 Jun 2022 15:19), Max: 98.5 (16 Jun 2022 06:22)  HR: 82 (16 Jun 2022 15:19) (81 - 86)  BP: 129/74 (16 Jun 2022 15:19) (123/69 - 161/83)  ABP: 144/81 (16 Jun 2022 13:15) (141/78 - 144/81)  RR: 18 (16 Jun 2022 15:19) (13 - 18)  SpO2: 94% (16 Jun 2022 15:19) (94% - 100%)    Neck - anterior dry dressing / HV +   Lungs : CTA B/L   CVS: S1S2 no rubs , no murmurs     Above noted and reviewed with NP ,  Hx of CAD - continue BB/ statins , restart ASA once OK with   ortho   Wound/ HV/ Abx - as per primary team   PT/OP/ pain mgmt     Thank you for the courtesy of this consult ,   will follow along with you .

## 2022-06-16 NOTE — DISCHARGE NOTE PROVIDER - NSDCFUADDAPPT_GEN_ALL_CORE_FT
Patient is to follow-up with Urologist Dr. Rehman or [facility] outpatient for catheter removal and trial of void within 1 week. Please call Dr. Kyle's office if there are any issues, questions or concerns.

## 2022-06-16 NOTE — DISCHARGE NOTE PROVIDER - CARE PROVIDERS DIRECT ADDRESSES
,DirectAddress_Unknown ,DirectAddress_Unknown,[email or web address]

## 2022-06-16 NOTE — PROGRESS NOTE ADULT - SUBJECTIVE AND OBJECTIVE BOX
Ortho Post Op Check    Name: DOMONIQUE WHITT    MR #: [id number]    Procedure: Anterior cervical discectomy and fusion C5-C6  Surgeon: Santosh    Pt comfortable without complaints, pain controlled  Denies CP, SOB, N/V, numbness/tingling     General Exam:  Vital Signs Last 24 Hrs  T(C): 36.6 (06-16-22 @ 15:19), Max: 36.6 (06-16-22 @ 15:19)  T(F): 97.8 (06-16-22 @ 15:19), Max: 97.8 (06-16-22 @ 15:19)  HR: 82 (06-16-22 @ 15:19) (82 - 82)  BP: 129/74 (06-16-22 @ 15:19) (129/74 - 129/74)  BP(mean): --  RR: 18 (06-16-22 @ 15:19) (18 - 18)  SpO2: 94% (06-16-22 @ 15:19) (94% - 94%)    General: Pt Alert and oriented, NAD, controlled pain.  Dressings C/D/I. No bleeding. HV intact, with good suction  Distal pulses intact throughout  Sensation: Grossly intact to light touch without deficit.  Motor: 4/5 strength C5-T1     A/P: 62yFemale POD#0 s/p Anterior cervical discectomy and fusion C5-C6  - Stable  - Pain Control  - DVT ppx: SCDs  - WBAT  - Ancef q 8 hours while HV drain in present

## 2022-06-16 NOTE — BRIEF OPERATIVE NOTE - CONDITION POST OP
stable, extubated Slit Excision Additional Text (Leave Blank If You Do Not Want): A linear line was drawn on the skin overlying the lesion. An incision was made slowly until the lesion was visualized.  Once visualized, the lesion was removed with blunt dissection.

## 2022-06-16 NOTE — CONSULT NOTE ADULT - SUBJECTIVE AND OBJECTIVE BOX
PMD:     CC: Neck pain    HPI:  62 year old female with a pmhx of pacemaker, sick sinus syndrome, asthma (denies any hospitalizations related to asthma), HTN, seizures last one 7 years ago on keppra and lamictal, hypothyroidism, breast cancer s/p left lumpectomy did not require chemotherapy, presents with  neck pain for years, history of ACDF C6-C7 in 2002 with good results, however now presents with recurrent neck pain.  Patient describes neck pain as constant, sharp, dull at times,  6/10 in severity, worse with forward flexion and extension, relief with flexeril and percocet, has tried steroid injections and physical therapy without relief, intermittent tingling to right arm and hand, pt states she constantly drops items. Patient is now anterior cervical discectomy and fusion C6-C7 POD #0.       PAST MEDICAL & SURGICAL HISTORY:  Seizure - last seizure 7 years ago  CAD (coronary artery disease)  Asthma  Hypertension  Hypothyroidism  Breast CA - left lumpectomy  Arthritis  Spinal stenosis - lumbar  At risk for sleep apnea - Bang score 4  Cervical spinal stenosis  History of cholecystectomy - 1985  History of appendectomy - 1986  H/O total hysterectomy - 1989  Cardiac pacemaker - 2002  S/P carpal tunnel release - 2004  H/O spinal fusion - 2004  H/O arthroscopy - left knee- 2004; right knee 2x 2003 and 2004  H/O cataract extraction - 2013  H/O decompression of ulnar nerve - bilateral arms 2018/2019    FAMILY HISTORY:  FHx: heart disease (Father)  FHx: breast cancer (Mother)    SOCIAL HISTORY:  Tobacco -   ETOH -   Drug use -     ALLERGIES:  amoxicillin (Anaphylaxis)  Bactrim (Anaphylaxis)  Betadine (Rash)  Biaxin (Anaphylaxis)  cefazolin (Anaphylaxis)  ceftriaxone (Anaphylaxis)  Cipro (Anaphylaxis)  doxycycline (Anaphylaxis)  erythromycin (Anaphylaxis)  Macrodantin (Anaphylaxis)  vancomycin (Anaphylaxis)  Zithromax (Anaphylaxis)  Zyvox (Anaphylaxis)    HOME MEDICATIONS:  albuterol: 0.083 pad(s) inhaled 4 times a day, As Needed (16 Jun 2022 06:21)  amLODIPine 2.5 mg oral tablet: 1 tab(s) orally once a day (16 Jun 2022 06:21)  aspirin 81 mg oral delayed release tablet: 1 tab(s) orally once a day (16 Jun 2022 06:21)  atorvastatin 20 mg oral tablet: 1 tab(s) orally once a day (16 Jun 2022 06:21)  Benadryl 25 mg oral capsule:  (16 Jun 2022 06:21)  EPINEPHrine: 0.9 milligram(s) injectable , As Needed (16 Jun 2022 06:21)  Flexeril 10 mg oral tablet: 1 tab(s) orally 3 times a day (16 Jun 2022 06:21)  folic acid 1 mg oral tablet: 1 tab(s) orally once a day (16 Jun 2022 06:21)  Keppra 500 mg oral tablet: 1 tab(s) orally 2 times a day (16 Jun 2022 06:21)  LaMICtal  mg oral tablet, extended release: 1 tab(s) orally 2 times a day (16 Jun 2022 06:21)  levothyroxine 100 mcg (0.1 mg) oral tablet: 1 tab(s) orally once a day (16 Jun 2022 06:21)  magnesium oxide 400 mg oral tablet: 1 tab(s) orally once a day (16 Jun 2022 06:21)  Metoprolol Succinate ER 50 mg oral tablet, extended release: 1 tab(s) orally once a day (16 Jun 2022 06:21)  Percocet 5/325 oral tablet: orally 3 times a day (16 Jun 2022 06:21)  potassium gluconate 595 mg (99 mg elemental potassium) oral tablet: orally once a day (16 Jun 2022 06:21)  Ventolin 90 mcg/inh inhalation aerosol:  (16 Jun 2022 06:21)  Vitamin B12 500 mcg oral tablet: 1 tab(s) orally once a day (16 Jun 2022 06:21)  Vitamin D3: 2000 unit(s) orally once a day (16 Jun 2022 06:21)      REVIEW OF SYSTEMS      General:	    Skin/Breast:  	  Ophthalmologic:  	  ENMT:	    Respiratory and Thorax:  	  Cardiovascular:	    Gastrointestinal:	    Genitourinary:	    Musculoskeletal:	    Neurological:	    Psychiatric:	    Hematology/Lymphatics:	    Endocrine:	    Allergic/Immunologic:	      Vital Signs Last 24 Hrs  T(C): 36.2 (16 Jun 2022 12:18), Max: 36.9 (16 Jun 2022 06:22)  T(F): 97.2 (16 Jun 2022 12:18), Max: 98.5 (16 Jun 2022 06:22)  HR: 82 (16 Jun 2022 13:30) (82 - 86)  BP: 130/62 (16 Jun 2022 13:30) (123/69 - 161/83)  BP(mean): --  RR: 14 (16 Jun 2022 13:30) (14 - 16)  SpO2: 97% (16 Jun 2022 13:30) (94% - 99%)    PHYSICAL EXAM:      Constitutional:    Eyes:    ENMT:    Neck:    Breasts:    Back:    Respiratory:    Cardiovascular:    Gastrointestinal:    Genitourinary:    Rectal:    Extremities:    Vascular:    Neurological:    Skin:    Lymph Nodes:    Musculoskeletal:    Psychiatric:     PMD: Dr. Diaz  Cardiologist: Dr. Lomas  Neurologist: Dr. Salamanca  Pain management: Dr. Henriquez    CC: Neck pain    HPI:  62 year old female with a pmhx of pacemaker, sick sinus syndrome, asthma (denies any hospitalizations related to asthma), HTN, seizures last one 7 years ago on keppra and lamictal, hypothyroidism, breast cancer s/p left lumpectomy did not require chemotherapy or radiation 8-9years ago, presents with  neck pain for years, history of ACDF C6-C7 in 2002 with good results, however now presents with recurrent neck pain.  Patient describes neck pain as constant, sharp, dull at times,  6/10 in severity, worse with forward flexion and extension, relief with flexeril and percocet, has tried steroid injections and physical therapy without relief, intermittent tingling to right arm and hand, pt states she constantly drops items. Patient is now anterior cervical discectomy and fusion C6-C7 POD #0. Patient seen and examined in PACU.  Patient complaining of neck pain at incision, controlled with pain medications.      PAST MEDICAL & SURGICAL HISTORY:  Seizure - last seizure 7 years ago  CAD (coronary artery disease)  Asthma  Hypertension  Hypothyroidism  Breast CA - left lumpectomy  Arthritis  Spinal stenosis - lumbar  At risk for sleep apnea - Bang score 4  Cervical spinal stenosis  History of cholecystectomy - 1985  History of appendectomy - 1986  H/O total hysterectomy - 1989  Cardiac pacemaker - 2002  S/P carpal tunnel release - 2004  H/O spinal fusion - 2004  H/O arthroscopy - left knee- 2004; right knee 2x 2003 and 2004  H/O cataract extraction - 2013  H/O decompression of ulnar nerve - bilateral arms 2018/2019    FAMILY HISTORY:  FHx: heart disease (Father)  FHx: breast cancer (Mother)    SOCIAL HISTORY:  Tobacco - Denies  ETOH - Denies  Drug use - Denies    ALLERGIES:  amoxicillin (Anaphylaxis)  Bactrim (Anaphylaxis)  Betadine (Rash)  Biaxin (Anaphylaxis)  cefazolin (Anaphylaxis)  ceftriaxone (Anaphylaxis)  Cipro (Anaphylaxis)  doxycycline (Anaphylaxis)  erythromycin (Anaphylaxis)  Macrodantin (Anaphylaxis)  vancomycin (Anaphylaxis)  Zithromax (Anaphylaxis)  Zyvox (Anaphylaxis)    HOME MEDICATIONS:  albuterol: 0.083 pad(s) inhaled 4 times a day, As Needed (16 Jun 2022 06:21)  amLODIPine 2.5 mg oral tablet: 1 tab(s) orally once a day (16 Jun 2022 06:21)  aspirin 81 mg oral delayed release tablet: 1 tab(s) orally once a day (16 Jun 2022 06:21)  atorvastatin 20 mg oral tablet: 1 tab(s) orally once a day (16 Jun 2022 06:21)  Benadryl 25 mg oral capsule:  (16 Jun 2022 06:21)  EPINEPHrine: 0.9 milligram(s) injectable , As Needed (16 Jun 2022 06:21)  Flexeril 10 mg oral tablet: 1 tab(s) orally 3 times a day (16 Jun 2022 06:21)  folic acid 1 mg oral tablet: 1 tab(s) orally once a day (16 Jun 2022 06:21)  Keppra 500 mg oral tablet: 1 tab(s) orally 2 times a day (16 Jun 2022 06:21)  LaMICtal  mg oral tablet, extended release: 1 tab(s) orally 2 times a day (16 Jun 2022 06:21)  levothyroxine 100 mcg (0.1 mg) oral tablet: 1 tab(s) orally once a day (16 Jun 2022 06:21)  magnesium oxide 400 mg oral tablet: 1 tab(s) orally once a day (16 Jun 2022 06:21)  Metoprolol Succinate ER 50 mg oral tablet, extended release: 1 tab(s) orally once a day (16 Jun 2022 06:21)  Percocet 5/325 oral tablet: orally 3 times a day (16 Jun 2022 06:21)  potassium gluconate 595 mg (99 mg elemental potassium) oral tablet: orally once a day (16 Jun 2022 06:21)  Ventolin 90 mcg/inh inhalation aerosol:  (16 Jun 2022 06:21)  Vitamin B12 500 mcg oral tablet: 1 tab(s) orally once a day (16 Jun 2022 06:21)  Vitamin D3: 2000 unit(s) orally once a day (16 Jun 2022 06:21)    MEDICATIONS  (STANDING):  amLODIPine   Tablet 2.5 milliGRAM(s) Oral daily  ascorbic acid 500 milliGRAM(s) Oral two times a day  atorvastatin 20 milliGRAM(s) Oral at bedtime  cholecalciferol 2000 Unit(s) Oral daily  clindamycin IVPB 900 milliGRAM(s) IV Intermittent <User Schedule>  cyanocobalamin 500 MICROGram(s) Oral daily  diphenhydrAMINE 25 milliGRAM(s) Oral daily  folic acid 1 milliGRAM(s) Oral daily  lactated ringers. 1000 milliLiter(s) (150 mL/Hr) IV Continuous <Continuous>  lamoTRIgine 250 milliGRAM(s) Oral two times a day  levETIRAcetam 500 milliGRAM(s) Oral two times a day  levothyroxine 100 MICROGram(s) Oral daily  magnesium oxide 400 milliGRAM(s) Oral daily  metoprolol succinate ER 50 milliGRAM(s) Oral daily  pantoprazole    Tablet 40 milliGRAM(s) Oral before breakfast  senna 2 Tablet(s) Oral at bedtime    MEDICATIONS  (PRN):  ALBUTerol    90 MICROgram(s) HFA Inhaler 2 Puff(s) Inhalation every 6 hours PRN Shortness of Breath and/or Wheezing  cyclobenzaprine 10 milliGRAM(s) Oral three times a day PRN Muscle Spasm  EPINEPHrine    Injectable 0.9 milliGRAM(s) IV Push once PRN Allergic reaction  HYDROmorphone   Tablet 2 milliGRAM(s) Oral every 4 hours PRN Severe Pain (7 - 10)  HYDROmorphone  Injectable 0.5 milliGRAM(s) IV Push every 15 minutes PRN Moderate Pain (4 - 6)  HYDROmorphone  Injectable 1 milliGRAM(s) IV Push every 1 hour PRN Severe Pain (7 - 10)  metoclopramide Injectable 10 milliGRAM(s) IV Push every 6 hours PRN Nausea and/or Vomiting  ondansetron Injectable 4 milliGRAM(s) IV Push once PRN Nausea and/or Vomiting  oxyCODONE    IR 10 milliGRAM(s) Oral every 4 hours PRN Moderate Pain (4 - 6)  oxyCODONE    IR 5 milliGRAM(s) Oral every 4 hours PRN Mild Pain (1 - 3)      REVIEW OF SYSTEMS:  CONSTITUTIONAL: No fever, weight loss, or fatigue  NECK: (+) neck pain  RESPIRATORY: No cough, wheezing, or chills; No shortness of breath  CARDIOVASCULAR: No chest pain, palpitations, dizziness, or leg swelling  GASTROINTESTINAL: No abdominal or epigastric pain. No nausea or vomiting; No diarrhea or constipation.  GENITOURINARY: No dysuria, frequency, hematuria, or incontinence  NEUROLOGICAL: No headaches, memory loss, loss of strength, numbness, or tremors  SKIN: No itching, burning, rashes, or lesions   MUSCULOSKELETAL: No joint pain or swelling; No muscle, back, or extremity pain  PSYCHIATRIC: No depression, anxiety, mood swings, or difficulty sleeping      Vital Signs Last 24 Hrs  T(C): 36.2 (16 Jun 2022 12:18), Max: 36.9 (16 Jun 2022 06:22)  T(F): 97.2 (16 Jun 2022 12:18), Max: 98.5 (16 Jun 2022 06:22)  HR: 82 (16 Jun 2022 13:30) (82 - 86)  BP: 130/62 (16 Jun 2022 13:30) (123/69 - 161/83)  BP(mean): --  RR: 14 (16 Jun 2022 13:30) (14 - 16)  SpO2: 97% (16 Jun 2022 13:30) (94% - 99%)      PHYSICAL EXAM:  GENERAL: NAD  HEAD:  Atraumatic, Normocephalic  NECK: anterior neck with clean dressing and hemovac  NERVOUS SYSTEM:  Alert & Oriented X3, Good concentration; Motor Strength 5/5 B/L upper and lower extremities  CHEST/LUNG: Clear to auscultation bilaterally  HEART: Regular rate and rhythm; No murmurs, rubs, or gallops  ABDOMEN: Soft, Nontender, Nondistended; Bowel sounds present  EXTREMITIES:  2+ Peripheral Pulses, No clubbing, cyanosis, or edema

## 2022-06-16 NOTE — DISCHARGE NOTE PROVIDER - NSDCHHNEEDSERVICE_GEN_ALL_CORE
Observation and assessment/Rehabilitation services Observation and assessment/Rehabilitation services/Other, specify...

## 2022-06-16 NOTE — DISCHARGE NOTE PROVIDER - NSDCFUSCHEDAPPT_GEN_ALL_CORE_FT
[facility]  [id number] [facility]  Scheduled Appointment: 06/24/2022

## 2022-06-17 LAB
ALBUMIN SERPL ELPH-MCNC: 3.4 G/DL — SIGNIFICANT CHANGE UP (ref 3.3–5.2)
ALP SERPL-CCNC: 69 U/L — SIGNIFICANT CHANGE UP (ref 40–120)
ALT FLD-CCNC: 17 U/L — SIGNIFICANT CHANGE UP
ANION GAP SERPL CALC-SCNC: 12 MMOL/L — SIGNIFICANT CHANGE UP (ref 5–17)
AST SERPL-CCNC: 50 U/L — HIGH
BILIRUB SERPL-MCNC: 0.5 MG/DL — SIGNIFICANT CHANGE UP (ref 0.4–2)
BUN SERPL-MCNC: 12.8 MG/DL — SIGNIFICANT CHANGE UP (ref 8–20)
CALCIUM SERPL-MCNC: 8.3 MG/DL — LOW (ref 8.6–10.2)
CHLORIDE SERPL-SCNC: 103 MMOL/L — SIGNIFICANT CHANGE UP (ref 98–107)
CO2 SERPL-SCNC: 23 MMOL/L — SIGNIFICANT CHANGE UP (ref 22–29)
CREAT SERPL-MCNC: 0.56 MG/DL — SIGNIFICANT CHANGE UP (ref 0.5–1.3)
EGFR: 102 ML/MIN/1.73M2 — SIGNIFICANT CHANGE UP
GLUCOSE SERPL-MCNC: 131 MG/DL — HIGH (ref 70–99)
HCT VFR BLD CALC: 40.1 % — SIGNIFICANT CHANGE UP (ref 34.5–45)
HGB BLD-MCNC: 13.2 G/DL — SIGNIFICANT CHANGE UP (ref 11.5–15.5)
MCHC RBC-ENTMCNC: 31.5 PG — SIGNIFICANT CHANGE UP (ref 27–34)
MCHC RBC-ENTMCNC: 32.9 GM/DL — SIGNIFICANT CHANGE UP (ref 32–36)
MCV RBC AUTO: 95.7 FL — SIGNIFICANT CHANGE UP (ref 80–100)
PLATELET # BLD AUTO: 190 K/UL — SIGNIFICANT CHANGE UP (ref 150–400)
POTASSIUM SERPL-MCNC: 4.3 MMOL/L — SIGNIFICANT CHANGE UP (ref 3.5–5.3)
POTASSIUM SERPL-SCNC: 4.3 MMOL/L — SIGNIFICANT CHANGE UP (ref 3.5–5.3)
PROT SERPL-MCNC: 6.7 G/DL — SIGNIFICANT CHANGE UP (ref 6.6–8.7)
RBC # BLD: 4.19 M/UL — SIGNIFICANT CHANGE UP (ref 3.8–5.2)
RBC # FLD: 13.2 % — SIGNIFICANT CHANGE UP (ref 10.3–14.5)
SODIUM SERPL-SCNC: 138 MMOL/L — SIGNIFICANT CHANGE UP (ref 135–145)
WBC # BLD: 8.96 K/UL — SIGNIFICANT CHANGE UP (ref 3.8–10.5)
WBC # FLD AUTO: 8.96 K/UL — SIGNIFICANT CHANGE UP (ref 3.8–10.5)

## 2022-06-17 PROCEDURE — 99232 SBSQ HOSP IP/OBS MODERATE 35: CPT

## 2022-06-17 RX ORDER — TAMSULOSIN HYDROCHLORIDE 0.4 MG/1
0.4 CAPSULE ORAL ONCE
Refills: 0 | Status: COMPLETED | OUTPATIENT
Start: 2022-06-17 | End: 2022-06-17

## 2022-06-17 RX ORDER — POLYETHYLENE GLYCOL 3350 17 G/17G
17 POWDER, FOR SOLUTION ORAL DAILY
Refills: 0 | Status: DISCONTINUED | OUTPATIENT
Start: 2022-06-17 | End: 2022-06-20

## 2022-06-17 RX ORDER — TAMSULOSIN HYDROCHLORIDE 0.4 MG/1
0.4 CAPSULE ORAL AT BEDTIME
Refills: 0 | Status: DISCONTINUED | OUTPATIENT
Start: 2022-06-17 | End: 2022-06-17

## 2022-06-17 RX ORDER — TAMSULOSIN HYDROCHLORIDE 0.4 MG/1
0.4 CAPSULE ORAL AT BEDTIME
Refills: 0 | Status: DISCONTINUED | OUTPATIENT
Start: 2022-06-18 | End: 2022-06-20

## 2022-06-17 RX ORDER — MAGNESIUM HYDROXIDE 400 MG/1
30 TABLET, CHEWABLE ORAL DAILY
Refills: 0 | Status: DISCONTINUED | OUTPATIENT
Start: 2022-06-17 | End: 2022-06-20

## 2022-06-17 RX ADMIN — HYDROMORPHONE HYDROCHLORIDE 2 MILLIGRAM(S): 2 INJECTION INTRAMUSCULAR; INTRAVENOUS; SUBCUTANEOUS at 03:48

## 2022-06-17 RX ADMIN — POLYETHYLENE GLYCOL 3350 17 GRAM(S): 17 POWDER, FOR SOLUTION ORAL at 12:58

## 2022-06-17 RX ADMIN — LAMOTRIGINE 250 MILLIGRAM(S): 25 TABLET, ORALLY DISINTEGRATING ORAL at 16:52

## 2022-06-17 RX ADMIN — TAMSULOSIN HYDROCHLORIDE 0.4 MILLIGRAM(S): 0.4 CAPSULE ORAL at 12:56

## 2022-06-17 RX ADMIN — HYDROMORPHONE HYDROCHLORIDE 2 MILLIGRAM(S): 2 INJECTION INTRAMUSCULAR; INTRAVENOUS; SUBCUTANEOUS at 08:28

## 2022-06-17 RX ADMIN — PREGABALIN 500 MICROGRAM(S): 225 CAPSULE ORAL at 08:24

## 2022-06-17 RX ADMIN — Medication 100 MILLIGRAM(S): at 16:57

## 2022-06-17 RX ADMIN — AMLODIPINE BESYLATE 2.5 MILLIGRAM(S): 2.5 TABLET ORAL at 05:19

## 2022-06-17 RX ADMIN — Medication 2000 UNIT(S): at 08:24

## 2022-06-17 RX ADMIN — MAGNESIUM OXIDE 400 MG ORAL TABLET 400 MILLIGRAM(S): 241.3 TABLET ORAL at 08:23

## 2022-06-17 RX ADMIN — Medication 100 MICROGRAM(S): at 05:19

## 2022-06-17 RX ADMIN — CYCLOBENZAPRINE HYDROCHLORIDE 10 MILLIGRAM(S): 10 TABLET, FILM COATED ORAL at 08:24

## 2022-06-17 RX ADMIN — HYDROMORPHONE HYDROCHLORIDE 2 MILLIGRAM(S): 2 INJECTION INTRAMUSCULAR; INTRAVENOUS; SUBCUTANEOUS at 02:47

## 2022-06-17 RX ADMIN — Medication 1 MILLIGRAM(S): at 08:23

## 2022-06-17 RX ADMIN — Medication 500 MILLIGRAM(S): at 16:53

## 2022-06-17 RX ADMIN — Medication 50 MILLIGRAM(S): at 05:19

## 2022-06-17 RX ADMIN — PANTOPRAZOLE SODIUM 40 MILLIGRAM(S): 20 TABLET, DELAYED RELEASE ORAL at 05:19

## 2022-06-17 RX ADMIN — ATORVASTATIN CALCIUM 20 MILLIGRAM(S): 80 TABLET, FILM COATED ORAL at 21:21

## 2022-06-17 RX ADMIN — Medication 100 MILLIGRAM(S): at 21:22

## 2022-06-17 RX ADMIN — LEVETIRACETAM 500 MILLIGRAM(S): 250 TABLET, FILM COATED ORAL at 16:56

## 2022-06-17 RX ADMIN — Medication 100 MILLIGRAM(S): at 08:21

## 2022-06-17 RX ADMIN — LEVETIRACETAM 500 MILLIGRAM(S): 250 TABLET, FILM COATED ORAL at 05:19

## 2022-06-17 RX ADMIN — HYDROMORPHONE HYDROCHLORIDE 2 MILLIGRAM(S): 2 INJECTION INTRAMUSCULAR; INTRAVENOUS; SUBCUTANEOUS at 09:28

## 2022-06-17 RX ADMIN — SENNA PLUS 2 TABLET(S): 8.6 TABLET ORAL at 21:21

## 2022-06-17 RX ADMIN — OXYCODONE HYDROCHLORIDE 10 MILLIGRAM(S): 5 TABLET ORAL at 16:02

## 2022-06-17 RX ADMIN — LAMOTRIGINE 250 MILLIGRAM(S): 25 TABLET, ORALLY DISINTEGRATING ORAL at 05:18

## 2022-06-17 RX ADMIN — Medication 500 MILLIGRAM(S): at 05:19

## 2022-06-17 RX ADMIN — Medication 100 MILLIGRAM(S): at 01:40

## 2022-06-17 NOTE — PROGRESS NOTE ADULT - SUBJECTIVE AND OBJECTIVE BOX
[id number]  DOMONIQUE [facility]    Patient seen and eval at bedside. Patient states before surgery she was unable to lift her right arm and now she is. Patient states her right arm feels about 50% better. Patient states before surgery she had pain numbness tingling in right arm. Patient does note she has history of B/L elbow ulnar nerve surgery in the past. Patient also states for past 3 month she has been having balance issues however has not told Dr. Kyle about it because she forgot. Patient denies difficuly breathing, swallowing, CP, SOB, dizziness, numbness/tingling. Patient was straight catheterized this morning around 6 a.m. as per RN for 500 ccs urinary retention.    T(C): 36.6 (06-17-22 @ 05:13), Max: 36.8 (06-16-22 @ 14:00)  HR: 68 (06-17-22 @ 05:13) (66 - 86)  BP: 117/70 (06-17-22 @ 05:13) (117/70 - 161/83)  RR: 18 (06-17-22 @ 05:13) (13 - 18)  SpO2: 94% (06-17-22 @ 05:13) (93% - 100%)    PE: NAD, alert awake  Neck: anterior dressing C/D/I, no bleeding or drainage noted, HV drain intact, 65 ccs output since surgery  Motor exam:      Upper extremity                               Delt        Bic         Tric       WF      WE                                               R          4/5       4/5        5/5       5/5       5/5                                               L          5/5        5/5        5/5       5/5      5/5           Lower extremity                             TA       EHL         GS                                                 R       5/5       5/5         5/5                                               L        5/5       5/5          5/5    SILT C5-T1 B/L, Rad pulses 2+ B/L  Calf soft, NT B/L    A/P: s/p ACDF C5-6 POD#1  ·	Pain control  ·	Monitor HV drain output  ·	Monitor urinary retention - patient has voided however since surgery and removal of catheter  ·	PT -WBAT  ·	D/w Dr. Kyle regarding her balance - continue with PT  ·	DVT propx - SCDs  ·	Med following

## 2022-06-17 NOTE — PROGRESS NOTE ADULT - SUBJECTIVE AND OBJECTIVE BOX
Patient seen and examined . S/p ACDF , POD # 1. Pain well controlled , states RUE pain much better , post op , no n/v,   voiding but noted with urinary retention on bladder scan - 600 ml , states has hx of not able to empty her bladder,   last BM 2 days ago .      CC : neck / R arm pain - postop well controlled, urinary retention      MEDICATIONS  (STANDING):  amLODIPine   Tablet 2.5 milliGRAM(s) Oral daily  ascorbic acid 500 milliGRAM(s) Oral two times a day  atorvastatin 20 milliGRAM(s) Oral at bedtime  cholecalciferol 2000 Unit(s) Oral daily  clindamycin IVPB 900 milliGRAM(s) IV Intermittent every 8 hours  cyanocobalamin 500 MICROGram(s) Oral daily  diphenhydrAMINE 25 milliGRAM(s) Oral daily  folic acid 1 milliGRAM(s) Oral daily  lamoTRIgine 250 milliGRAM(s) Oral two times a day  levETIRAcetam 500 milliGRAM(s) Oral two times a day  levothyroxine 100 MICROGram(s) Oral daily  magnesium oxide 400 milliGRAM(s) Oral daily  metoprolol succinate ER 50 milliGRAM(s) Oral daily  pantoprazole    Tablet 40 milliGRAM(s) Oral before breakfast  senna 2 Tablet(s) Oral at bedtime  tamsulosin 0.4 milliGRAM(s) Oral at bedtime  tamsulosin 0.4 milliGRAM(s) Oral once    MEDICATIONS  (PRN):  ALBUTerol    90 MICROgram(s) HFA Inhaler 2 Puff(s) Inhalation every 6 hours PRN Shortness of Breath and/or Wheezing  cyclobenzaprine 10 milliGRAM(s) Oral three times a day PRN Muscle Spasm  EPINEPHrine    Injectable 0.9 milliGRAM(s) IV Push once PRN Allergic reaction  HYDROmorphone   Tablet 2 milliGRAM(s) Oral every 4 hours PRN Severe Pain (7 - 10)  metoclopramide Injectable 10 milliGRAM(s) IV Push every 6 hours PRN Nausea and/or Vomiting  oxyCODONE    IR 10 milliGRAM(s) Oral every 4 hours PRN Moderate Pain (4 - 6)  oxyCODONE    IR 5 milliGRAM(s) Oral every 4 hours PRN Mild Pain (1 - 3)      LABS:                          13.2   8.96  )-----------( 190      ( 17 Jun 2022 08:21 )             40.1     06-17    138  |  103  |  12.8  ----------------------------<  131<H>  4.3   |  23.0  |  0.56    Ca    8.3<L>      17 Jun 2022 06:10    TPro  6.7  /  Alb  3.4  /  TBili  0.5  /  DBili  x   /  AST  50<H>  /  ALT  17  /  AlkPhos  69  06-17    I&O's Detail    16 Jun 2022 07:01  -  17 Jun 2022 07:00  --------------------------------------------------------  IN:    Oral Fluid: 50 mL  Total IN: 50 mL    OUT:    Accordian (mL): 65 mL    Intermittent Catheterization - Urethral (mL): 600 mL    Voided (mL): 1100 mL  Total OUT: 1765 mL    Total NET: -1715 mL            REVIEW OF SYSTEMS:    As above , all other systems are reviewed and are negative     Vital Signs Last 24 Hrs  T(C): 36.8 (17 Jun 2022 09:06), Max: 36.8 (16 Jun 2022 14:00)  T(F): 98.3 (17 Jun 2022 09:06), Max: 98.3 (17 Jun 2022 09:06)  HR: 76 (17 Jun 2022 09:06) (66 - 86)  BP: 138/80 (17 Jun 2022 09:06) (117/70 - 161/83)  BP(mean): --  RR: 18 (17 Jun 2022 09:06) (13 - 18)  SpO2: 93% (17 Jun 2022 09:06) (93% - 100%)  PHYSICAL EXAM:    GENERAL: NAD, well-groomed,obese  Neck - anterior dry dressing + , clean and dry ,   HV+   HEAD:  Atraumatic, Normocephalic  EYES: EOMI, PERRLA, conjunctiva and sclera clear  NECK: Supple, No JVD, Normal thyroid  NERVOUS SYSTEM:  Alert & Oriented X4, no focal deficit  CHEST/LUNG: CTA b/l ,  no  rales, rhonchi, wheezing, or rubs  HEART: Regular rate and rhythm; No murmurs, rubs, or gallops  ABDOMEN: Soft, Nontender, Nondistended; Bowel sounds present  EXTREMITIES:  2+ Peripheral Pulses, No clubbing, cyanosis, or edema  LYMPH: No lymphadenopathy noted  SKIN: No rashes or lesions

## 2022-06-18 LAB
APPEARANCE UR: CLEAR — SIGNIFICANT CHANGE UP
BACTERIA # UR AUTO: ABNORMAL
BILIRUB UR-MCNC: NEGATIVE — SIGNIFICANT CHANGE UP
COLOR SPEC: YELLOW — SIGNIFICANT CHANGE UP
DIFF PNL FLD: ABNORMAL
EPI CELLS # UR: ABNORMAL
GLUCOSE UR QL: NEGATIVE MG/DL — SIGNIFICANT CHANGE UP
KETONES UR-MCNC: NEGATIVE — SIGNIFICANT CHANGE UP
LEUKOCYTE ESTERASE UR-ACNC: NEGATIVE — SIGNIFICANT CHANGE UP
NITRITE UR-MCNC: NEGATIVE — SIGNIFICANT CHANGE UP
PH UR: 5 — SIGNIFICANT CHANGE UP (ref 5–8)
PROT UR-MCNC: NEGATIVE — SIGNIFICANT CHANGE UP
RBC CASTS # UR COMP ASSIST: ABNORMAL /HPF (ref 0–4)
SP GR SPEC: 1.02 — SIGNIFICANT CHANGE UP (ref 1.01–1.02)
UROBILINOGEN FLD QL: NEGATIVE MG/DL — SIGNIFICANT CHANGE UP
WBC UR QL: SIGNIFICANT CHANGE UP /HPF (ref 0–5)

## 2022-06-18 PROCEDURE — 99232 SBSQ HOSP IP/OBS MODERATE 35: CPT

## 2022-06-18 RX ORDER — BENZOCAINE AND MENTHOL 5; 1 G/100ML; G/100ML
1 LIQUID ORAL THREE TIMES A DAY
Refills: 0 | Status: DISCONTINUED | OUTPATIENT
Start: 2022-06-18 | End: 2022-06-20

## 2022-06-18 RX ADMIN — LEVETIRACETAM 500 MILLIGRAM(S): 250 TABLET, FILM COATED ORAL at 05:33

## 2022-06-18 RX ADMIN — Medication 100 MILLIGRAM(S): at 13:19

## 2022-06-18 RX ADMIN — Medication 2000 UNIT(S): at 11:41

## 2022-06-18 RX ADMIN — Medication 500 MILLIGRAM(S): at 18:22

## 2022-06-18 RX ADMIN — AMLODIPINE BESYLATE 2.5 MILLIGRAM(S): 2.5 TABLET ORAL at 05:35

## 2022-06-18 RX ADMIN — LAMOTRIGINE 250 MILLIGRAM(S): 25 TABLET, ORALLY DISINTEGRATING ORAL at 05:34

## 2022-06-18 RX ADMIN — LAMOTRIGINE 250 MILLIGRAM(S): 25 TABLET, ORALLY DISINTEGRATING ORAL at 18:21

## 2022-06-18 RX ADMIN — OXYCODONE HYDROCHLORIDE 10 MILLIGRAM(S): 5 TABLET ORAL at 05:33

## 2022-06-18 RX ADMIN — ATORVASTATIN CALCIUM 20 MILLIGRAM(S): 80 TABLET, FILM COATED ORAL at 21:35

## 2022-06-18 RX ADMIN — Medication 100 MICROGRAM(S): at 05:33

## 2022-06-18 RX ADMIN — SENNA PLUS 2 TABLET(S): 8.6 TABLET ORAL at 21:36

## 2022-06-18 RX ADMIN — OXYCODONE HYDROCHLORIDE 10 MILLIGRAM(S): 5 TABLET ORAL at 06:00

## 2022-06-18 RX ADMIN — PANTOPRAZOLE SODIUM 40 MILLIGRAM(S): 20 TABLET, DELAYED RELEASE ORAL at 05:33

## 2022-06-18 RX ADMIN — Medication 25 MILLIGRAM(S): at 11:40

## 2022-06-18 RX ADMIN — BENZOCAINE AND MENTHOL 1 LOZENGE: 5; 1 LIQUID ORAL at 09:35

## 2022-06-18 RX ADMIN — OXYCODONE HYDROCHLORIDE 10 MILLIGRAM(S): 5 TABLET ORAL at 18:22

## 2022-06-18 RX ADMIN — TAMSULOSIN HYDROCHLORIDE 0.4 MILLIGRAM(S): 0.4 CAPSULE ORAL at 21:35

## 2022-06-18 RX ADMIN — Medication 50 MILLIGRAM(S): at 05:35

## 2022-06-18 RX ADMIN — OXYCODONE HYDROCHLORIDE 10 MILLIGRAM(S): 5 TABLET ORAL at 18:39

## 2022-06-18 RX ADMIN — Medication 100 MILLIGRAM(S): at 05:35

## 2022-06-18 RX ADMIN — Medication 1 MILLIGRAM(S): at 11:41

## 2022-06-18 RX ADMIN — Medication 500 MILLIGRAM(S): at 05:34

## 2022-06-18 RX ADMIN — POLYETHYLENE GLYCOL 3350 17 GRAM(S): 17 POWDER, FOR SOLUTION ORAL at 11:41

## 2022-06-18 RX ADMIN — PREGABALIN 500 MICROGRAM(S): 225 CAPSULE ORAL at 11:40

## 2022-06-18 RX ADMIN — MAGNESIUM OXIDE 400 MG ORAL TABLET 400 MILLIGRAM(S): 241.3 TABLET ORAL at 11:40

## 2022-06-18 RX ADMIN — Medication 100 MILLIGRAM(S): at 21:36

## 2022-06-18 RX ADMIN — LEVETIRACETAM 500 MILLIGRAM(S): 250 TABLET, FILM COATED ORAL at 18:21

## 2022-06-18 NOTE — PROGRESS NOTE ADULT - SUBJECTIVE AND OBJECTIVE BOX
DOMONIQUE [facility]    [id number]    62y      Female    Patient is a 62y old  Female who presents with a chief complaint of s/p ACDF (2022 11:34)      INTERVAL HPI/OVERNIGHT EVENTS:    Patient is doing ok, her pain in the neck is controlled with pain, drain has been d/khalif    REVIEW OF SYSTEMS:    CONSTITUTIONAL: No fever, fatigue  RESPIRATORY: No cough, No shortness of breath  CARDIOVASCULAR: No chest pain, palpitations  GASTROINTESTINAL: No abdominal, No nausea, vomiting  NEUROLOGICAL: No headaches,  loss of strength.  MISCELLANEOUS: neck pain is better with pain meds      Vital Signs Last 24 Hrs  T(C): 36.7 (2022 09:45), Max: 36.7 (2022 09:45)  T(F): 98.1 (2022 09:45), Max: 98.1 (2022 09:45)  HR: 78 (2022 09:45) (75 - 80)  BP: 124/81 (2022 09:45) (118/70 - 131/79)  RR: 18 (2022 05:00) (18 - 18)  SpO2: 92% (2022 09:45) (92% - 93%)    PHYSICAL EXAM:    GENERAL: Middle age female looking comfortable   HEENT: neck with dressings on, some soaking   NECK: soft, Supple, No JVD,   CHEST/LUNG: Clear to auscultate bilaterally; No wheezing  HEART: S1S2+, Regular rate and rhythm; No murmurs  ABDOMEN: Soft, Nontender, Nondistended; Bowel sounds present  EXTREMITIES:  1+ Peripheral Pulses, No edema  SKIN: No rashes or lesions  NEURO: AAOX3, no focal deficits, no motor r sensory loss  PSYCH: normal mood      LABS:                        13.2   8.96  )-----------( 190      ( 2022 08:21 )             40.1     06-    138  |  103  |  12.8  ----------------------------<  131<H>  4.3   |  23.0  |  0.56    Ca    8.3<L>      2022 06:10    TPro  6.7  /  Alb  3.4  /  TBili  0.5  /  DBili  x   /  AST  50<H>  /  ALT  17  /  AlkPhos  69  -      Urinalysis Basic - ( 2022 12:09 )    Color: Yellow / Appearance: Clear / S.025 / pH: x  Gluc: x / Ketone: Negative  / Bili: Negative / Urobili: Negative mg/dL   Blood: x / Protein: Negative / Nitrite: Negative   Leuk Esterase: Negative / RBC: 6-10 /HPF / WBC 3-5 /HPF   Sq Epi: x / Non Sq Epi: Moderate / Bacteria: Few          I&O's Summary    2022 07:01  -  2022 07:00  --------------------------------------------------------  IN: 0 mL / OUT: 607 mL / NET: -607 mL        MEDICATIONS  (STANDING):  amLODIPine   Tablet 2.5 milliGRAM(s) Oral daily  ascorbic acid 500 milliGRAM(s) Oral two times a day  atorvastatin 20 milliGRAM(s) Oral at bedtime  cholecalciferol 2000 Unit(s) Oral daily  clindamycin IVPB 900 milliGRAM(s) IV Intermittent every 8 hours  cyanocobalamin 500 MICROGram(s) Oral daily  diphenhydrAMINE 25 milliGRAM(s) Oral daily  folic acid 1 milliGRAM(s) Oral daily  lamoTRIgine 250 milliGRAM(s) Oral two times a day  levETIRAcetam 500 milliGRAM(s) Oral two times a day  levothyroxine 100 MICROGram(s) Oral daily  magnesium oxide 400 milliGRAM(s) Oral daily  metoprolol succinate ER 50 milliGRAM(s) Oral daily  pantoprazole    Tablet 40 milliGRAM(s) Oral before breakfast  polyethylene glycol 3350 17 Gram(s) Oral daily  senna 2 Tablet(s) Oral at bedtime  tamsulosin 0.4 milliGRAM(s) Oral at bedtime    MEDICATIONS  (PRN):  ALBUTerol    90 MICROgram(s) HFA Inhaler 2 Puff(s) Inhalation every 6 hours PRN Shortness of Breath and/or Wheezing  benzocaine 15 mG/menthol 3.6 mG Lozenge 1 Lozenge Oral three times a day PRN Sore Throat  cyclobenzaprine 10 milliGRAM(s) Oral three times a day PRN Muscle Spasm  EPINEPHrine    Injectable 0.9 milliGRAM(s) IV Push once PRN Allergic reaction  HYDROmorphone   Tablet 2 milliGRAM(s) Oral every 4 hours PRN Severe Pain (7 - 10)  magnesium hydroxide Suspension 30 milliLiter(s) Oral daily PRN Constipation  metoclopramide Injectable 10 milliGRAM(s) IV Push every 6 hours PRN Nausea and/or Vomiting  oxyCODONE    IR 10 milliGRAM(s) Oral every 4 hours PRN Moderate Pain (4 - 6)  oxyCODONE    IR 5 milliGRAM(s) Oral every 4 hours PRN Mild Pain (1 - 3)

## 2022-06-18 NOTE — PROGRESS NOTE ADULT - SUBJECTIVE AND OBJECTIVE BOX
Patient seen and eval at bedside. Patient reports no changes since yesterday. Denies motor sensory changes. Last bladder scan 600 ccs at 7 am this morning. Patient denies any urge to urinate at this time.  Patient denies CP, SOB, dizziness.      PE: NAD, alert awake  Neck: anterior dressing C/D/I, no bleeding or drainage noted     Upper extremity                               Delt        Bic         Tric       WF      WE                                               R          4/5       4/5 5/5 5/5       5/5                                               L          5/5 5/5 5/5 5/5      5/5           Lower extremity                             TA       EHL         GS                                                 R       5/5       5/5         5/5                                               L        5/5 5/5 5/5    SILT C5-T1 B/L, Rad pulses 2+ B/L  Calf soft, NT B/L    A/P: s/p ACDF C5-6 POD#1  ·	Pain control  ·	Monitor urinary retention - will d/w medical team regarding mcbride catheterization for urinary retention  ·	PT -WBAT  ·	DVT propx - SCDs  ·	Med following  ·	Dispo: home today pending PT and med clearance   Patient seen and eval at bedside. Patient reports no changes since yesterday. Denies motor sensory changes. Last bladder scan 600 ccs at 7 am this morning. Patient denies any urge to urinate at this time.  Patient denies CP, SOB, dizziness.    Vital Signs Last 24 Hrs  T(C): 36.5 (18 Jun 2022 05:00), Max: 36.6 (17 Jun 2022 17:08)  T(F): 97.7 (18 Jun 2022 05:00), Max: 97.9 (17 Jun 2022 17:08)  HR: 75 (18 Jun 2022 05:00) (75 - 80)  BP: 118/70 (18 Jun 2022 05:00) (118/70 - 131/79)  BP(mean): --  RR: 18 (18 Jun 2022 05:00) (18 - 18)  SpO2: 92% (18 Jun 2022 05:00) (92% - 93%)    PE: NAD, alert awake  Neck: anterior dressing C/D/I, no bleeding or drainage noted     Upper extremity                               Delt        Bic         Tric       WF      WE                                               R          4/5       4/5        5/5       5/5       5/5                                               L          5/5        5/5        5/5       5/5      5/5           Lower extremity                             TA       EHL         GS                                                 R       5/5       5/5         5/5                                               L        5/5       5/5          5/5    SILT C5-T1 B/L, Rad pulses 2+ B/L  Calf soft, NT B/L    A/P: s/p ACDF C5-6 POD#1  ·	Pain control  ·	Monitor urinary retention - will d/w medical team regarding mcbride catheterization for urinary retention  ·	PT -WBAT  ·	DVT propx - SCDs  ·	Med following  ·	Dispo: home today pending PT and med clearance

## 2022-06-18 NOTE — PROGRESS NOTE ADULT - SUBJECTIVE AND OBJECTIVE BOX
DOMONIQUE [facility]    [id number]    62y      Female    Patient is a 62y old  Female who presents with a chief complaint of s/p ACDF (2022 11:34)      INTERVAL HPI/OVERNIGHT EVENTS:    patient is feeling some sob and edema, her pain is well controlled, denies fever, chills, chest pain     REVIEW OF SYSTEMS:    CONSTITUTIONAL: No fever, fatigue  RESPIRATORY: No cough, some shortness of breath  CARDIOVASCULAR: No chest pain, palpitations  GASTROINTESTINAL: No abdominal, No nausea, vomiting  NEUROLOGICAL: No headaches,  loss of strength.  MISCELLANEOUS: pain is well controlled     Vital Signs Last 24 Hrs  T(C): 36.7 (2022 09:45), Max: 36.7 (2022 09:45)  T(F): 98.1 (2022 09:45), Max: 98.1 (2022 09:45)  HR: 78 (2022 09:45) (75 - 80)  BP: 124/81 (2022 09:45) (118/70 - 131/79)  BP(mean): --  RR: 18 (2022 05:00) (18 - 18)  SpO2: 92% (2022 09:45) (92% - 93%)    PHYSICAL EXAM:    GENERAL: Middle age female looking comfortable   HEENT: neck with dressings on, some soaking   NECK: soft, Supple, No JVD,   CHEST/LUNG: Decrease air entry bilaterally; No wheezing, minimal basal crackles   HEART: S1S2+, Regular rate and rhythm; No murmurs  ABDOMEN: Soft, Nontender, Nondistended; Bowel sounds present  EXTREMITIES:  1+ Peripheral Pulses, No edema  SKIN: No rashes or lesions  NEURO: AAOX3, no focal deficits, no motor r sensory loss  PSYCH: normal mood      LABS:                        13.2   8.96  )-----------( 190      ( 2022 08:21 )             40.1     06-    138  |  103  |  12.8  ----------------------------<  131<H>  4.3   |  23.0  |  0.56    Ca    8.3<L>      2022 06:10    TPro  6.7  /  Alb  3.4  /  TBili  0.5  /  DBili  x   /  AST  50<H>  /  ALT  17  /  AlkPhos  69  -      Urinalysis Basic - ( 2022 12:09 )    Color: Yellow / Appearance: Clear / S.025 / pH: x  Gluc: x / Ketone: Negative  / Bili: Negative / Urobili: Negative mg/dL   Blood: x / Protein: Negative / Nitrite: Negative   Leuk Esterase: Negative / RBC: 6-10 /HPF / WBC 3-5 /HPF   Sq Epi: x / Non Sq Epi: Moderate / Bacteria: Few          I&O's Summary    2022 07:01  -  2022 07:00  --------------------------------------------------------  IN: 0 mL / OUT: 607 mL / NET: -607 mL        MEDICATIONS  (STANDING):  amLODIPine   Tablet 2.5 milliGRAM(s) Oral daily  ascorbic acid 500 milliGRAM(s) Oral two times a day  atorvastatin 20 milliGRAM(s) Oral at bedtime  cholecalciferol 2000 Unit(s) Oral daily  clindamycin IVPB 900 milliGRAM(s) IV Intermittent every 8 hours  cyanocobalamin 500 MICROGram(s) Oral daily  diphenhydrAMINE 25 milliGRAM(s) Oral daily  folic acid 1 milliGRAM(s) Oral daily  lamoTRIgine 250 milliGRAM(s) Oral two times a day  levETIRAcetam 500 milliGRAM(s) Oral two times a day  levothyroxine 100 MICROGram(s) Oral daily  magnesium oxide 400 milliGRAM(s) Oral daily  metoprolol succinate ER 50 milliGRAM(s) Oral daily  pantoprazole    Tablet 40 milliGRAM(s) Oral before breakfast  polyethylene glycol 3350 17 Gram(s) Oral daily  senna 2 Tablet(s) Oral at bedtime  tamsulosin 0.4 milliGRAM(s) Oral at bedtime    MEDICATIONS  (PRN):  ALBUTerol    90 MICROgram(s) HFA Inhaler 2 Puff(s) Inhalation every 6 hours PRN Shortness of Breath and/or Wheezing  benzocaine 15 mG/menthol 3.6 mG Lozenge 1 Lozenge Oral three times a day PRN Sore Throat  cyclobenzaprine 10 milliGRAM(s) Oral three times a day PRN Muscle Spasm  EPINEPHrine    Injectable 0.9 milliGRAM(s) IV Push once PRN Allergic reaction  HYDROmorphone   Tablet 2 milliGRAM(s) Oral every 4 hours PRN Severe Pain (7 - 10)  magnesium hydroxide Suspension 30 milliLiter(s) Oral daily PRN Constipation  metoclopramide Injectable 10 milliGRAM(s) IV Push every 6 hours PRN Nausea and/or Vomiting  oxyCODONE    IR 10 milliGRAM(s) Oral every 4 hours PRN Moderate Pain (4 - 6)  oxyCODONE    IR 5 milliGRAM(s) Oral every 4 hours PRN Mild Pain (1 - 3)

## 2022-06-19 PROCEDURE — 99232 SBSQ HOSP IP/OBS MODERATE 35: CPT

## 2022-06-19 PROCEDURE — 71045 X-RAY EXAM CHEST 1 VIEW: CPT | Mod: 26

## 2022-06-19 RX ORDER — ETHACRYNIC ACID 25 MG/1
50 TABLET ORAL ONCE
Refills: 0 | Status: COMPLETED | OUTPATIENT
Start: 2022-06-19 | End: 2022-06-19

## 2022-06-19 RX ORDER — FUROSEMIDE 40 MG
40 TABLET ORAL ONCE
Refills: 0 | Status: DISCONTINUED | OUTPATIENT
Start: 2022-06-19 | End: 2022-06-19

## 2022-06-19 RX ORDER — IPRATROPIUM/ALBUTEROL SULFATE 18-103MCG
3 AEROSOL WITH ADAPTER (GRAM) INHALATION ONCE
Refills: 0 | Status: COMPLETED | OUTPATIENT
Start: 2022-06-19 | End: 2022-06-19

## 2022-06-19 RX ADMIN — MAGNESIUM OXIDE 400 MG ORAL TABLET 400 MILLIGRAM(S): 241.3 TABLET ORAL at 11:12

## 2022-06-19 RX ADMIN — Medication 100 MILLIGRAM(S): at 14:38

## 2022-06-19 RX ADMIN — Medication 2000 UNIT(S): at 11:12

## 2022-06-19 RX ADMIN — Medication 3 MILLILITER(S): at 03:06

## 2022-06-19 RX ADMIN — PREGABALIN 500 MICROGRAM(S): 225 CAPSULE ORAL at 11:13

## 2022-06-19 RX ADMIN — LEVETIRACETAM 500 MILLIGRAM(S): 250 TABLET, FILM COATED ORAL at 17:57

## 2022-06-19 RX ADMIN — LAMOTRIGINE 250 MILLIGRAM(S): 25 TABLET, ORALLY DISINTEGRATING ORAL at 17:57

## 2022-06-19 RX ADMIN — ETHACRYNIC ACID 50 MILLIGRAM(S): 25 TABLET ORAL at 11:12

## 2022-06-19 RX ADMIN — SENNA PLUS 2 TABLET(S): 8.6 TABLET ORAL at 21:04

## 2022-06-19 RX ADMIN — OXYCODONE HYDROCHLORIDE 10 MILLIGRAM(S): 5 TABLET ORAL at 11:11

## 2022-06-19 RX ADMIN — LAMOTRIGINE 250 MILLIGRAM(S): 25 TABLET, ORALLY DISINTEGRATING ORAL at 06:13

## 2022-06-19 RX ADMIN — LEVETIRACETAM 500 MILLIGRAM(S): 250 TABLET, FILM COATED ORAL at 06:13

## 2022-06-19 RX ADMIN — ALBUTEROL 2 PUFF(S): 90 AEROSOL, METERED ORAL at 01:55

## 2022-06-19 RX ADMIN — Medication 1 MILLIGRAM(S): at 11:12

## 2022-06-19 RX ADMIN — Medication 500 MILLIGRAM(S): at 17:58

## 2022-06-19 RX ADMIN — PANTOPRAZOLE SODIUM 40 MILLIGRAM(S): 20 TABLET, DELAYED RELEASE ORAL at 06:13

## 2022-06-19 RX ADMIN — ATORVASTATIN CALCIUM 20 MILLIGRAM(S): 80 TABLET, FILM COATED ORAL at 21:04

## 2022-06-19 RX ADMIN — Medication 25 MILLIGRAM(S): at 11:12

## 2022-06-19 RX ADMIN — Medication 100 MILLIGRAM(S): at 05:17

## 2022-06-19 RX ADMIN — POLYETHYLENE GLYCOL 3350 17 GRAM(S): 17 POWDER, FOR SOLUTION ORAL at 11:31

## 2022-06-19 RX ADMIN — OXYCODONE HYDROCHLORIDE 10 MILLIGRAM(S): 5 TABLET ORAL at 12:25

## 2022-06-19 RX ADMIN — Medication 500 MILLIGRAM(S): at 06:14

## 2022-06-19 RX ADMIN — TAMSULOSIN HYDROCHLORIDE 0.4 MILLIGRAM(S): 0.4 CAPSULE ORAL at 21:04

## 2022-06-19 RX ADMIN — OXYCODONE HYDROCHLORIDE 10 MILLIGRAM(S): 5 TABLET ORAL at 06:13

## 2022-06-19 RX ADMIN — Medication 100 MICROGRAM(S): at 06:13

## 2022-06-19 RX ADMIN — OXYCODONE HYDROCHLORIDE 10 MILLIGRAM(S): 5 TABLET ORAL at 06:45

## 2022-06-19 NOTE — PROGRESS NOTE ADULT - SUBJECTIVE AND OBJECTIVE BOX
DOMONIQUE [facility]    [id number]    62y      Female    Patient is a 62y old  Female who presents with a chief complaint of Medical management (2022 15:01)      INTERVAL HPI/OVERNIGHT EVENTS:    Patient is doing ok, her pain in the neck is controlled with pain, drain has been d/khalif    REVIEW OF SYSTEMS:    CONSTITUTIONAL: No fever, fatigue  RESPIRATORY: No cough, No shortness of breath  CARDIOVASCULAR: No chest pain, palpitations  GASTROINTESTINAL: No abdominal, No nausea, vomiting  NEUROLOGICAL: No headaches,  loss of strength.  MISCELLANEOUS: neck pain is better with pain meds      Vital Signs Last 24 Hrs  T(C): 36.8 (2022 04:30), Max: 36.8 (2022 11:00)  T(F): 98.2 (2022 04:30), Max: 98.3 (2022 11:00)  HR: 87 (2022 04:30) (82 - 101)  BP: 107/72 (2022 04:30) (107/72 - 119/78)  RR: 18 (2022 04:30) (18 - 19)  SpO2: 94% (2022 04:30) (93% - 95%)    PHYSICAL EXAM:    GENERAL: Middle age female looking comfortable   HEENT: neck with dressings on, some soaking   NECK: soft, Supple, No JVD,   CHEST/LUNG: Clear to auscultate bilaterally; No wheezing  HEART: S1S2+, Regular rate and rhythm; No murmurs  ABDOMEN: Soft, Nontender, Nondistended; Bowel sounds present  EXTREMITIES:  1+ Peripheral Pulses, No edema  SKIN: No rashes or lesions  NEURO: AAOX3, no focal deficits, no motor r sensory loss  PSYCH: normal mood      LABS:            Urinalysis Basic - ( 2022 12:09 )    Color: Yellow / Appearance: Clear / S.025 / pH: x  Gluc: x / Ketone: Negative  / Bili: Negative / Urobili: Negative mg/dL   Blood: x / Protein: Negative / Nitrite: Negative   Leuk Esterase: Negative / RBC: 6-10 /HPF / WBC 3-5 /HPF   Sq Epi: x / Non Sq Epi: Moderate / Bacteria: Few          I&O's Summary    2022 07:01  -  2022 07:00  --------------------------------------------------------  IN: 0 mL / OUT: 2650 mL / NET: -2650 mL        MEDICATIONS  (STANDING):  amLODIPine   Tablet 2.5 milliGRAM(s) Oral daily  ascorbic acid 500 milliGRAM(s) Oral two times a day  atorvastatin 20 milliGRAM(s) Oral at bedtime  cholecalciferol 2000 Unit(s) Oral daily  cyanocobalamin 500 MICROGram(s) Oral daily  diphenhydrAMINE 25 milliGRAM(s) Oral daily  folic acid 1 milliGRAM(s) Oral daily  lamoTRIgine 250 milliGRAM(s) Oral two times a day  levETIRAcetam 500 milliGRAM(s) Oral two times a day  levothyroxine 100 MICROGram(s) Oral daily  magnesium oxide 400 milliGRAM(s) Oral daily  metoprolol succinate ER 50 milliGRAM(s) Oral daily  pantoprazole    Tablet 40 milliGRAM(s) Oral before breakfast  polyethylene glycol 3350 17 Gram(s) Oral daily  senna 2 Tablet(s) Oral at bedtime  tamsulosin 0.4 milliGRAM(s) Oral at bedtime    MEDICATIONS  (PRN):  ALBUTerol    90 MICROgram(s) HFA Inhaler 2 Puff(s) Inhalation every 6 hours PRN Shortness of Breath and/or Wheezing  benzocaine 15 mG/menthol 3.6 mG Lozenge 1 Lozenge Oral three times a day PRN Sore Throat  cyclobenzaprine 10 milliGRAM(s) Oral three times a day PRN Muscle Spasm  EPINEPHrine    Injectable 0.9 milliGRAM(s) IV Push once PRN Allergic reaction  HYDROmorphone   Tablet 2 milliGRAM(s) Oral every 4 hours PRN Severe Pain (7 - 10)  magnesium hydroxide Suspension 30 milliLiter(s) Oral daily PRN Constipation  metoclopramide Injectable 10 milliGRAM(s) IV Push every 6 hours PRN Nausea and/or Vomiting  oxyCODONE    IR 10 milliGRAM(s) Oral every 4 hours PRN Moderate Pain (4 - 6)  oxyCODONE    IR 5 milliGRAM(s) Oral every 4 hours PRN Mild Pain (1 - 3)         DOMONIQUE [facility]    [id number]    62y      Female    Patient is a 62y old  Female who presents with a chief complaint of Medical management (2022 15:01)      INTERVAL HPI/OVERNIGHT EVENTS:    Patient is doing ok, her pain in the neck is controlled with pain, drain has been d/khalif    REVIEW OF SYSTEMS:    CONSTITUTIONAL: No fever, fatigue  RESPIRATORY: No cough, No shortness of breath  CARDIOVASCULAR: No chest pain, palpitations  GASTROINTESTINAL: No abdominal, No nausea, vomiting  NEUROLOGICAL: No headaches,  loss of strength.  MISCELLANEOUS: neck pain is better with pain meds      Vital Signs Last 24 Hrs  T(C): 36.8 (2022 04:30), Max: 36.8 (2022 11:00)  T(F): 98.2 (2022 04:30), Max: 98.3 (2022 11:00)  HR: 87 (2022 04:30) (82 - 101)  BP: 107/72 (2022 04:30) (107/72 - 119/78)  RR: 18 (2022 04:30) (18 - 19)  SpO2: 94% (2022 04:30) (93% - 95%)    PHYSICAL EXAM:    GENERAL: Middle age female looking comfortable   HEENT: neck with dressings on, some soaking   NECK: soft, Supple, No JVD,   CHEST/LUNG: Decrease air entry bilaterally; basal crackles, No wheezing  HEART: S1S2+, Regular rate and rhythm; No murmurs  ABDOMEN: Soft, Nontender, Nondistended; Bowel sounds present  EXTREMITIES:  1+ Peripheral Pulses, No edema  SKIN: No rashes or lesions  NEURO: AAOX3, no focal deficits, no motor r sensory loss  PSYCH: normal mood      LABS:            Urinalysis Basic - ( 2022 12:09 )    Color: Yellow / Appearance: Clear / S.025 / pH: x  Gluc: x / Ketone: Negative  / Bili: Negative / Urobili: Negative mg/dL   Blood: x / Protein: Negative / Nitrite: Negative   Leuk Esterase: Negative / RBC: 6-10 /HPF / WBC 3-5 /HPF   Sq Epi: x / Non Sq Epi: Moderate / Bacteria: Few          I&O's Summary    2022 07:01  -  2022 07:00  --------------------------------------------------------  IN: 0 mL / OUT: 2650 mL / NET: -2650 mL        MEDICATIONS  (STANDING):  amLODIPine   Tablet 2.5 milliGRAM(s) Oral daily  ascorbic acid 500 milliGRAM(s) Oral two times a day  atorvastatin 20 milliGRAM(s) Oral at bedtime  cholecalciferol 2000 Unit(s) Oral daily  cyanocobalamin 500 MICROGram(s) Oral daily  diphenhydrAMINE 25 milliGRAM(s) Oral daily  folic acid 1 milliGRAM(s) Oral daily  lamoTRIgine 250 milliGRAM(s) Oral two times a day  levETIRAcetam 500 milliGRAM(s) Oral two times a day  levothyroxine 100 MICROGram(s) Oral daily  magnesium oxide 400 milliGRAM(s) Oral daily  metoprolol succinate ER 50 milliGRAM(s) Oral daily  pantoprazole    Tablet 40 milliGRAM(s) Oral before breakfast  polyethylene glycol 3350 17 Gram(s) Oral daily  senna 2 Tablet(s) Oral at bedtime  tamsulosin 0.4 milliGRAM(s) Oral at bedtime    MEDICATIONS  (PRN):  ALBUTerol    90 MICROgram(s) HFA Inhaler 2 Puff(s) Inhalation every 6 hours PRN Shortness of Breath and/or Wheezing  benzocaine 15 mG/menthol 3.6 mG Lozenge 1 Lozenge Oral three times a day PRN Sore Throat  cyclobenzaprine 10 milliGRAM(s) Oral three times a day PRN Muscle Spasm  EPINEPHrine    Injectable 0.9 milliGRAM(s) IV Push once PRN Allergic reaction  HYDROmorphone   Tablet 2 milliGRAM(s) Oral every 4 hours PRN Severe Pain (7 - 10)  magnesium hydroxide Suspension 30 milliLiter(s) Oral daily PRN Constipation  metoclopramide Injectable 10 milliGRAM(s) IV Push every 6 hours PRN Nausea and/or Vomiting  oxyCODONE    IR 10 milliGRAM(s) Oral every 4 hours PRN Moderate Pain (4 - 6)  oxyCODONE    IR 5 milliGRAM(s) Oral every 4 hours PRN Mild Pain (1 - 3)

## 2022-06-19 NOTE — PROGRESS NOTE ADULT - SUBJECTIVE AND OBJECTIVE BOX
Patient seen and eval at bedside. Patient reports no changes since yesterday. Denies motor sensory changes. Patient states she has not gotten up with PT and her back is bothering her. She also notes some SOB. Denies CP SOB, dizziness.    Vital Signs Last 24 Hrs  T(C): 36.7 (19 Jun 2022 16:32), Max: 36.8 (18 Jun 2022 21:28)  T(F): 98.1 (19 Jun 2022 16:32), Max: 98.3 (19 Jun 2022 11:00)  HR: 101 (19 Jun 2022 16:32) (82 - 102)  BP: 117/75 (19 Jun 2022 16:32) (101/70 - 155/77)  BP(mean): --  RR: 19 (19 Jun 2022 16:32) (18 - 20)  SpO2: 93% (19 Jun 2022 16:32) (91% - 96%)    PE: NAD, alert awake  Neck: anterior dressing C/D/I, no bleeding or drainage noted     Upper extremity                               Delt        Bic         Tric       WF      WE                                               R          4/5       4/5        5/5       5/5       5/5                                               L          5/5        5/5        5/5       5/5      5/5           Lower extremity                             TA       EHL         GS                                                 R       5/5       5/5         5/5                                               L        5/5       5/5          5/5    SILT C5-T1 B/L, Rad pulses 2+ B/L  Calf soft, NT B/L        A/P: s/p ACDF C5-6 POD#3  ·	Pain control  ·	Priest catheter placed  ·	PT -WBAT - called PT who stated they would come see patient today  ·	DVT propx - SCDs  ·	Med following - D/w Dr. Reddy - will start diuretic and nebulizer and monitor for improvement  ·	D/w Dr. Frost

## 2022-06-19 NOTE — CHART NOTE - NSCHARTNOTEFT_GEN_A_CORE
RN called to notify pt with SOB.    Pt assessed at bedside. Pt states she is feeling some SOB and it feels similar to her asthma when she needs to use her rescue inhaler at home. Pt states she last used her rescue inhaler ~4-5days prior to coming to [facility] for pre-sx testing. Pt denies any prior hospitalizations for asthma exacerbation. Pt states she has not used rescue inhaler while admitted to the hospital but feels she would benefit from use.     ROS: No chest pain, palpitations, light headedness, dizziness, headache, nausea/vomiting, fevers/chills, abdominal pain.       Vital Signs Last 24 Hrs  T(C): 36.8 (19 Jun 2022 01:36), Max: 36.8 (18 Jun 2022 16:57)  T(F): 98.2 (19 Jun 2022 01:36), Max: 98.2 (18 Jun 2022 16:57)  HR: 86 (19 Jun 2022 01:36) (75 - 90)  BP: 155/77 (19 Jun 2022 01:36) (118/70 - 155/77)  RR: 20 (19 Jun 2022 01:36) (18 - 20)  SpO2: 94% (19 Jun 2022 01:36) (91% - 94%)      GENERAL: A&Ox3, NAD, lying comfortably, ACDF dressing present.   HEAD: Atraumatic, Normocephalic  EYES: conjunctiva and sclera clear  ENMT: Moist mucous membranes  NERVOUS SYSTEM: Alert and awake, Good concentration  CHEST/LUNG: mild wheezing b/l, Unlabored respirations, no acute resp distress, speaking in full sentences, 3L NC in place  HEART: S1S2+, Regular rate  ABDOMEN: Soft, Nontender, Nondistended; BS+   EXTREMITIES: b/l LE +edema, no tenderness to palpation of b/l LE   SKIN: Warm and dry      Plan  STAT Duoneb ordered  PRN albuterol inhaler  ordered immediate CXR  continue to monitor  RN to notify of acute changes in pt status  VSS at this time RN called to notify pt with SOB.    Pt assessed at bedside. Pt states she is feeling some SOB and it feels similar to her asthma when she needs to use her rescue inhaler at home. Pt states she last used her rescue inhaler ~4-5days prior to coming to [facility] for pre-sx testing. Pt denies any prior hospitalizations for asthma exacerbation. Pt states she has not used rescue inhaler while admitted to the hospital but feels she would benefit from use.     ROS: No chest pain, palpitations, light headedness, dizziness, headache, nausea/vomiting, fevers/chills, abdominal pain.       Vital Signs Last 24 Hrs  T(C): 36.8 (19 Jun 2022 01:36), Max: 36.8 (18 Jun 2022 16:57)  T(F): 98.2 (19 Jun 2022 01:36), Max: 98.2 (18 Jun 2022 16:57)  HR: 86 (19 Jun 2022 01:36) (75 - 90)  BP: 155/77 (19 Jun 2022 01:36) (118/70 - 155/77)  RR: 20 (19 Jun 2022 01:36) (18 - 20)  SpO2: 94% (19 Jun 2022 01:36) (91% - 94%)      GENERAL: A&Ox3, NAD, lying comfortably, ACDF dressing present.   HEAD: Atraumatic, Normocephalic  EYES: conjunctiva and sclera clear  ENMT: Moist mucous membranes  NERVOUS SYSTEM: Alert and awake, Good concentration  CHEST/LUNG: mild wheezing b/l, Unlabored respirations, no acute resp distress, speaking in full sentences, 3L NC in place  HEART: S1S2+, Regular rate  ABDOMEN: Soft, Nontender, Nondistended; BS+   EXTREMITIES: b/l LE +edema, no tenderness to palpation of b/l LE   SKIN: Warm and dry      Plan  STAT Duoneb ordered  PRN albuterol inhaler  ordered immediate CXR  continue to monitor  RN to notify of acute changes in pt status  VSS at this time      Addendum 0331  Pt assessed at bedside. Pt states she feels SOB has improved since Duoneb treatment was completed.   Denies chest pain, palpitations, dizziness, syncope, h/a, abd pain, n/v.     Vital Signs Last 24 Hrs  T(C): 36.8 (19 Jun 2022 01:36), Max: 36.8 (18 Jun 2022 16:57)  T(F): 98.2 (19 Jun 2022 01:36), Max: 98.2 (18 Jun 2022 16:57)  HR: 102 (19 Jun 2022 03:09) (75 - 102)  BP: 155/77 (19 Jun 2022 01:36) (118/70 - 155/77)  RR: 20 (19 Jun 2022 01:36) (18 - 20)  SpO2: 96% (19 Jun 2022 03:09) (91% - 96%)      GENERAL: NAD, lying comfortably  NERVOUS SYSTEM: Alert and awake, Good concentration, A&OX3  CHEST/LUNG: Clear to auscultation b/l; Unlabored respirations, no wheezing present, satting 96% on 3L NC.   HEART: S1S2+  ABDOMEN: Soft, Nontender, Nondistended; BS+   EXTREMITIES:  +edema b/l, no tenderness to palpation of b/l LE  SKIN: Warm and dry      Plan  CXR reviewed; wet read: fluid congestion present b/l with heart enlargement present. final read pending.  Duoneb completed with improved SOB.  Called pharmacy to discuss options of diuretic use; given allergy to Bactrim with rxn of anaphylaxis and Sulfa component with potential crossreactivity in most diuretics, options are limited. Diuretic options include Ethacrynic Acid.   continue to monitor  RN to notify of acute changes in pt status  VSS at this time bed mobility training/strengthening/gait training/transfer training

## 2022-06-20 ENCOUNTER — TRANSCRIPTION ENCOUNTER (OUTPATIENT)
Age: 62
End: 2022-06-20

## 2022-06-20 VITALS
RESPIRATION RATE: 18 BRPM | SYSTOLIC BLOOD PRESSURE: 139 MMHG | HEART RATE: 86 BPM | TEMPERATURE: 98 F | DIASTOLIC BLOOD PRESSURE: 97 MMHG | OXYGEN SATURATION: 96 %

## 2022-06-20 PROCEDURE — 82962 GLUCOSE BLOOD TEST: CPT

## 2022-06-20 PROCEDURE — 94640 AIRWAY INHALATION TREATMENT: CPT

## 2022-06-20 PROCEDURE — 97110 THERAPEUTIC EXERCISES: CPT

## 2022-06-20 PROCEDURE — 97163 PT EVAL HIGH COMPLEX 45 MIN: CPT

## 2022-06-20 PROCEDURE — 71045 X-RAY EXAM CHEST 1 VIEW: CPT

## 2022-06-20 PROCEDURE — 99232 SBSQ HOSP IP/OBS MODERATE 35: CPT

## 2022-06-20 PROCEDURE — 81001 URINALYSIS AUTO W/SCOPE: CPT

## 2022-06-20 PROCEDURE — C1713: CPT

## 2022-06-20 PROCEDURE — 76000 FLUOROSCOPY <1 HR PHYS/QHP: CPT

## 2022-06-20 PROCEDURE — C1889: CPT

## 2022-06-20 PROCEDURE — 85027 COMPLETE CBC AUTOMATED: CPT

## 2022-06-20 PROCEDURE — 36415 COLL VENOUS BLD VENIPUNCTURE: CPT

## 2022-06-20 PROCEDURE — 97116 GAIT TRAINING THERAPY: CPT

## 2022-06-20 PROCEDURE — 80053 COMPREHEN METABOLIC PANEL: CPT

## 2022-06-20 RX ORDER — TAMSULOSIN HYDROCHLORIDE 0.4 MG/1
1 CAPSULE ORAL
Qty: 10 | Refills: 0
Start: 2022-06-20 | End: 2022-06-29

## 2022-06-20 RX ORDER — ALBUTEROL 90 UG/1
0.08 AEROSOL, METERED ORAL
Qty: 0 | Refills: 0 | DISCHARGE

## 2022-06-20 RX ORDER — EPINEPHRINE 0.3 MG/.3ML
9 INJECTION INTRAMUSCULAR; SUBCUTANEOUS
Qty: 0 | Refills: 0 | DISCHARGE
Start: 2022-06-20

## 2022-06-20 RX ORDER — DIPHENHYDRAMINE HCL 50 MG
0 CAPSULE ORAL
Qty: 0 | Refills: 0 | DISCHARGE

## 2022-06-20 RX ORDER — EPINEPHRINE 0.3 MG/.3ML
0.9 INJECTION INTRAMUSCULAR; SUBCUTANEOUS
Qty: 0 | Refills: 0 | DISCHARGE

## 2022-06-20 RX ORDER — ALBUTEROL 90 UG/1
0 AEROSOL, METERED ORAL
Qty: 0 | Refills: 0 | DISCHARGE

## 2022-06-20 RX ORDER — OXYCODONE HYDROCHLORIDE 5 MG/1
1 TABLET ORAL
Qty: 28 | Refills: 0
Start: 2022-06-20

## 2022-06-20 RX ORDER — SENNOSIDES/DOCUSATE SODIUM 8.6MG-50MG
2 TABLET ORAL
Qty: 30 | Refills: 0
Start: 2022-06-20 | End: 2022-07-04

## 2022-06-20 RX ADMIN — LEVETIRACETAM 500 MILLIGRAM(S): 250 TABLET, FILM COATED ORAL at 05:15

## 2022-06-20 RX ADMIN — MAGNESIUM OXIDE 400 MG ORAL TABLET 400 MILLIGRAM(S): 241.3 TABLET ORAL at 13:08

## 2022-06-20 RX ADMIN — OXYCODONE HYDROCHLORIDE 10 MILLIGRAM(S): 5 TABLET ORAL at 02:23

## 2022-06-20 RX ADMIN — PREGABALIN 500 MICROGRAM(S): 225 CAPSULE ORAL at 13:22

## 2022-06-20 RX ADMIN — OXYCODONE HYDROCHLORIDE 10 MILLIGRAM(S): 5 TABLET ORAL at 13:23

## 2022-06-20 RX ADMIN — Medication 1 MILLIGRAM(S): at 13:12

## 2022-06-20 RX ADMIN — Medication 100 MICROGRAM(S): at 05:14

## 2022-06-20 RX ADMIN — LAMOTRIGINE 250 MILLIGRAM(S): 25 TABLET, ORALLY DISINTEGRATING ORAL at 05:18

## 2022-06-20 RX ADMIN — POLYETHYLENE GLYCOL 3350 17 GRAM(S): 17 POWDER, FOR SOLUTION ORAL at 13:10

## 2022-06-20 RX ADMIN — AMLODIPINE BESYLATE 2.5 MILLIGRAM(S): 2.5 TABLET ORAL at 05:15

## 2022-06-20 RX ADMIN — PANTOPRAZOLE SODIUM 40 MILLIGRAM(S): 20 TABLET, DELAYED RELEASE ORAL at 05:15

## 2022-06-20 RX ADMIN — OXYCODONE HYDROCHLORIDE 10 MILLIGRAM(S): 5 TABLET ORAL at 02:56

## 2022-06-20 RX ADMIN — Medication 2000 UNIT(S): at 13:18

## 2022-06-20 RX ADMIN — Medication 500 MILLIGRAM(S): at 05:14

## 2022-06-20 RX ADMIN — Medication 25 MILLIGRAM(S): at 13:09

## 2022-06-20 RX ADMIN — Medication 50 MILLIGRAM(S): at 05:15

## 2022-06-20 RX ADMIN — OXYCODONE HYDROCHLORIDE 10 MILLIGRAM(S): 5 TABLET ORAL at 14:23

## 2022-06-20 NOTE — PROGRESS NOTE ADULT - SUBJECTIVE AND OBJECTIVE BOX
DOMONIQUE [facility]    [id number]    History:  The patient is status post ACDF C5-C6, POD # 4. Patient is feeling better today. The patient's pain is controlled using the prescribed pain medications. The patient is participating in physical therapy. She ambulated in room yesterday with PT. SOB improving per pt.. No new complaints. No acute motor or sensory changes are reported.    Vital Signs Last 24 Hrs  T(C): 36.8 (20 Jun 2022 04:30), Max: 36.8 (19 Jun 2022 11:00)  T(F): 98.2 (20 Jun 2022 04:30), Max: 98.3 (19 Jun 2022 11:00)  HR: 87 (20 Jun 2022 04:30) (82 - 101)  BP: 107/72 (20 Jun 2022 04:30) (107/72 - 119/78)  BP(mean): --  RR: 18 (20 Jun 2022 04:30) (18 - 19)  SpO2: 94% (20 Jun 2022 04:30) (93% - 95%)  I&O's Summary    19 Jun 2022 07:01  -  20 Jun 2022 07:00  --------------------------------------------------------  IN: 0 mL / OUT: 2650 mL / NET: -2650 mL                  MEDICATIONS  (STANDING):  amLODIPine   Tablet 2.5 milliGRAM(s) Oral daily  ascorbic acid 500 milliGRAM(s) Oral two times a day  atorvastatin 20 milliGRAM(s) Oral at bedtime  cholecalciferol 2000 Unit(s) Oral daily  cyanocobalamin 500 MICROGram(s) Oral daily  diphenhydrAMINE 25 milliGRAM(s) Oral daily  folic acid 1 milliGRAM(s) Oral daily  lamoTRIgine 250 milliGRAM(s) Oral two times a day  levETIRAcetam 500 milliGRAM(s) Oral two times a day  levothyroxine 100 MICROGram(s) Oral daily  magnesium oxide 400 milliGRAM(s) Oral daily  metoprolol succinate ER 50 milliGRAM(s) Oral daily  pantoprazole    Tablet 40 milliGRAM(s) Oral before breakfast  polyethylene glycol 3350 17 Gram(s) Oral daily  senna 2 Tablet(s) Oral at bedtime  tamsulosin 0.4 milliGRAM(s) Oral at bedtime    MEDICATIONS  (PRN):  ALBUTerol    90 MICROgram(s) HFA Inhaler 2 Puff(s) Inhalation every 6 hours PRN Shortness of Breath and/or Wheezing  benzocaine 15 mG/menthol 3.6 mG Lozenge 1 Lozenge Oral three times a day PRN Sore Throat  cyclobenzaprine 10 milliGRAM(s) Oral three times a day PRN Muscle Spasm  EPINEPHrine    Injectable 0.9 milliGRAM(s) IV Push once PRN Allergic reaction  HYDROmorphone   Tablet 2 milliGRAM(s) Oral every 4 hours PRN Severe Pain (7 - 10)  magnesium hydroxide Suspension 30 milliLiter(s) Oral daily PRN Constipation  metoclopramide Injectable 10 milliGRAM(s) IV Push every 6 hours PRN Nausea and/or Vomiting  oxyCODONE    IR 10 milliGRAM(s) Oral every 4 hours PRN Moderate Pain (4 - 6)  oxyCODONE    IR 5 milliGRAM(s) Oral every 4 hours PRN Mild Pain (1 - 3)      Physical exam: Lying in bed in NAD, awake and alert  Neck: anterior dressing C/D/I, no bleeding or drainage noted     Upper extremity                               Delt        Bic         Tric       WF      WE                                               R          5/5       5/5        5/5       5/5       5/5                                               L          5/5        5/5        5/5       5/5      5/5           Lower extremity                             TA       EHL         GS                                                 R       5/5       5/5         5/5                                               L        5/5       5/5          5/5    SILT C5-T1 B/L, Rad pulses 2+ B/L  Calf soft, NT B/L    Primary Orthopedic Assessment:  • S/P ACDF C5-6, POD#4    Secondary  Medical Assessment(s):   • hypoxia improving    Plan:   Pain control  Continue Priest catheter per urology  PT -WBAT   DVT propx - SCDs  Med following- diuertic, poss dc NC  DC planning for home when cleared by PT and medicine [facility]ONI[facility]    [id number]    History:  The patient is status post ACDF C5-C6, POD # 4. Patient is feeling better today. The patient's pain is controlled using the prescribed pain medications. The patient is participating in physical therapy. She ambulated in room yesterday with PT. SOB improving per pt.. No new complaints. No acute motor or sensory changes are reported.    Vital Signs Last 24 Hrs  T(C): 36.8 (20 Jun 2022 04:30), Max: 36.8 (19 Jun 2022 11:00)  T(F): 98.2 (20 Jun 2022 04:30), Max: 98.3 (19 Jun 2022 11:00)  HR: 87 (20 Jun 2022 04:30) (82 - 101)  BP: 107/72 (20 Jun 2022 04:30) (107/72 - 119/78)  BP(mean): --  RR: 18 (20 Jun 2022 04:30) (18 - 19)  SpO2: 94% (20 Jun 2022 04:30) (93% - 95%)  I&O's Summary    19 Jun 2022 07:01  -  20 Jun 2022 07:00  --------------------------------------------------------  IN: 0 mL / OUT: 2650 mL / NET: -2650 mL                  MEDICATIONS  (STANDING):  amLODIPine   Tablet 2.5 milliGRAM(s) Oral daily  ascorbic acid 500 milliGRAM(s) Oral two times a day  atorvastatin 20 milliGRAM(s) Oral at bedtime  cholecalciferol 2000 Unit(s) Oral daily  cyanocobalamin 500 MICROGram(s) Oral daily  diphenhydrAMINE 25 milliGRAM(s) Oral daily  folic acid 1 milliGRAM(s) Oral daily  lamoTRIgine 250 milliGRAM(s) Oral two times a day  levETIRAcetam 500 milliGRAM(s) Oral two times a day  levothyroxine 100 MICROGram(s) Oral daily  magnesium oxide 400 milliGRAM(s) Oral daily  metoprolol succinate ER 50 milliGRAM(s) Oral daily  pantoprazole    Tablet 40 milliGRAM(s) Oral before breakfast  polyethylene glycol 3350 17 Gram(s) Oral daily  senna 2 Tablet(s) Oral at bedtime  tamsulosin 0.4 milliGRAM(s) Oral at bedtime    MEDICATIONS  (PRN):  ALBUTerol    90 MICROgram(s) HFA Inhaler 2 Puff(s) Inhalation every 6 hours PRN Shortness of Breath and/or Wheezing  benzocaine 15 mG/menthol 3.6 mG Lozenge 1 Lozenge Oral three times a day PRN Sore Throat  cyclobenzaprine 10 milliGRAM(s) Oral three times a day PRN Muscle Spasm  EPINEPHrine    Injectable 0.9 milliGRAM(s) IV Push once PRN Allergic reaction  HYDROmorphone   Tablet 2 milliGRAM(s) Oral every 4 hours PRN Severe Pain (7 - 10)  magnesium hydroxide Suspension 30 milliLiter(s) Oral daily PRN Constipation  metoclopramide Injectable 10 milliGRAM(s) IV Push every 6 hours PRN Nausea and/or Vomiting  oxyCODONE    IR 10 milliGRAM(s) Oral every 4 hours PRN Moderate Pain (4 - 6)  oxyCODONE    IR 5 milliGRAM(s) Oral every 4 hours PRN Mild Pain (1 - 3)      Physical exam: Lying in bed in NAD, awake and alert  Neck: anterior dressing C/D/I, no bleeding or drainage noted     Upper extremity                               Delt        Bic         Tric       WF      WE                                               R          5/5       5/5        5/5       5/5       5/5                                               L          5/5        5/5        5/5       5/5      5/5           Lower extremity                             TA       EHL         GS                                                 R       5/5       5/5         5/5                                               L        5/5       5/5          5/5    SILT C5-T1 B/L, Rad pulses 2+ B/L  Calf soft, NT B/L    Primary Orthopedic Assessment:  • S/P ACDF C5-6, POD#4    Secondary  Medical Assessment(s):   • hypoxia improving    Plan:   Pain control  Continue Priest catheter per medicine and f/u with urology  PT -WBAT   DVT propx - SCDs  Med following- diuertic, poss dc NC  DC planning for home when cleared by PT and medicine

## 2022-06-20 NOTE — PROGRESS NOTE ADULT - PROVIDER SPECIALTY LIST ADULT
Orthopedics
PAO FORDE  : 1962  ACCOUNT:  [id number]  [phone]  PCP: Dr. Sweta Lin    TODAY'S DATE: 2017  DICTATED BY:  [Esthela Barr M.D.]    CHIEF COMPLAINT: [Followup of . CHF, left ventricular and Followup of Ischemic cardiomyopathy.]    HPI:  [On 2017, Pao Forde, a 54-year-old female, presented with fatigue, dizziness.]    Pao returns today stating that she continues to have periods of fluid overload alternating with periods of profound dehydration. She continues to have recurrent episodes of nausea and vomiting in the morning and so fat, a GI workup has been completely negative. On previous examinations, we also excluded the presence of constriction, the suspicion for which is raised by the fact that the patient is a recipient of the Heartnet.  At the time of my examination, the patient was completely euvolemic. She had no lower extremity edema and the lungs were clear.     NYHA Class: 1  RISK FACTORS:  AAA - Family History of AAA    REVIEW OF SYSTEMS:  CONS: no fever, chills, or recent weight changes. EYES: denies significant visual changes. ENMT: denies difficulties with hearing, otherwise negative. CV: dizziness, no chest pain. RESP: denies dyspnea, cough or wheezing. GI: denies melena, hematochezia. : no hematuria. INTEG: no new rashes, lesions. MS: back pain. NEURO: lightheaded, dizziness. HEM/LYMPH: denies easy bruising. ALL: no new food or environmental allergies.    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 Significant for 
AAA.  SOCIAL HISTORY: SMOKING: Former tobacco use. smoked 1/2 pack per day for 9 years, quit 2004. CAFFEINE: 1 cup coffee daily. ALCOHOL: drinks occasionally. EXERCISE: walks 30mins 5x week and ON HOLD. DIET: low calorie, low fat. MARITAL STATUS: . LIFESTYLE: active lifestyle. EDUCATION: college graduate. OCCUPATION: teacher.    ALLERGIES: No Known Allergies    MEDICATIONS: Selected prescriptions see below    VITAL SIGNS: [B/P - 80/52 , Pulse - 52, Weight -  117, Height -   65 , BMI - 19.5 ]    DECISION MAKING: We will continue to monitor Pao's fluid status. If the fluctuations in fluid status continue then I will repeat a right heart cath with simultaneous left ventricular pressure measurements to make sure that she has not developed pericardial constriction. I have scheduled her to see me in June 2017.     ASSESSMENT:  1. . CAD, of native vessels  2. . CHF, left ventricular  3. Varicose vein w/ inflammation  4. Dyspnea, unspecified  5. Hypercholesteremia, pure  6. ICD reprogramming  7. ICD, history of appropriate VT/VF therapy 12/20/04  8. ICD, not pacemaker dependent  9. Ischemic cardiomyopathy  10. 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 Other fatigue  12. Venous insufficiency, chronic  13. _Continous Inotropic Infusion  14. _Listed for OHT  15. New onset exertional chest pain  16. S/P placement of HeartNet cardiac restraint device 6/30/09    PLAN:  [We will continue to monitor Pao's fluid status. If the fluctuations in fluid status continue then I will repeat a right heart cath with simultaneous left ventricular pressure measurements to make sure that she has not developed pericardial constriction. I have scheduled her to see me in June 2017. ]    PRESCRIPTIONS:  04/20/17 *Zolpidem Tartrate ER 6.25MG    1 TABLET AT BEDTIME AS NEEDED FOR SL  04/18/17 *Simvastatin          40MG      1 BY MOUTH DAILY AT BEDTIME (NEED LI  12/16/16 *Plavix               75MG      1 BY MOUTH DAILY  11/22/16 *Nystatin             
Hospitalist
Hospitalist
Orthopedics
[id number]  SWISH AND SWALLOW 5ML 4 TIMES DAILY.  09/07/16 *Omeprazole           40MG      1 BY MOUTH DAILY  08/18/16 *Lisinopril           20MG      1 TABLET DAILY AS DIRECTED.  08/18/16 *Metoprolol Succinate 100MG     1 TABLET  BY MOUTH DAILY  08/18/16 *Potassium Chloride [id number]     1 TABLET DAILY AS DIRECTED.  06/08/16 Torsemide             10MG      one tablet as needed  12/18/15 Norco                 7.5-325M  one tablet every 8hr  12/18/15 Zofran                8MG       one tablet daily  12/18/15 Zohydro ER            20MG      one tablet twice daily  09/23/09 Aspirin               81MG      one tablet daily by mouth  09/23/09 Multivitamins                   one tablet daily by mouth  09/23/09 Co Q-10               150MG     one tablet by mouth daily  09/23/09 Folic Acid            400MCG    one tablet by mouth daily        Esthela Barr M.D.    ANN/rt - DD: 04/20/2017 - DT: 04/25/2017 - Job ID: [id number]   C: Dr. Sweta Lin          
Hospitalist

## 2022-06-20 NOTE — PROGRESS NOTE ADULT - ASSESSMENT
62 year old female with a pmhx of pacemaker, sick sinus syndrome, asthma (denies any hospitalizations related to asthma), HTN, seizures last one 7 years ago on keppra and lamictal, hypothyroidism, breast cancer s/p left lumpectomy did not require chemotherapy or radiation 8-9years ago, presents with  neck pain for years, history of ACDF C6-C7 in 2002 with good results, however now presents with recurrent neck pain.  Patient describes neck pain as constant, sharp, dull at times,  6/10 in severity, worse with forward flexion and extension, relief with flexeril and percocet, has tried steroid injections and physical therapy without relief, intermittent tingling to right arm and hand, pt states she constantly drops items. Patient is now anterior cervical discectomy and fusion C6-C7 POD #03.     Cervical spine stenosis  S/p ACDF C6-C7 POD #03  Pain control - patient reports she follows with pain management outpatient.  PT, drain has been d/khalif   Antibiotics, wound care, drain management and DVT prophylaxis as per primary team.  Incentive spirometry.  Avoid opioid induced constipation.  Neck pain is better     HTN  Continue Toprol and Amlodipine with parameters.    HLD  Continue statin.    Asthma  Patient reports she takes Albuterol prn.- stable     Seizure  Continue Keppra and Lamictal.  Seizure precautions.    Hypothyroid  Continue Synthroid.    H/o Breast cancer  S/p lumpectomy 8-9years ago, didn't need chemo and RT.    Urinary retention - hx of not being able to empty bladder, likely postop exacerbation on chronic urinary retention, started on Flomax, she is retaining ~600ml, Urology consult or could put foleys in and have follow up with urology for further management in the office, UA done and has 2 to 3 wbs, denies burning or difficulty urination.      Prediabetes : preop A1C  5.8 , ADA  , life style changes and need to follow up with PMD in 3 months to check A1C and follow further recommendations     Volume over load: CXR with increase vascular markings, she require some O2, she got 1 dose of ethacrynic acid 50mg, she feels better, will wean off O2 if she does not require.     Possible FLORENCIA: patient have desaturation while sleep, need out patient sleep study, counselled for wait loss.      DVT prophylaxis  SCDs     Patient is stable from the medicine point of view for discharge pending PT and Ortho eval, need to wean off supplemental O2 before patient being discharge, if still require O2 then might need more diuretics.     
62 year old female with a pmhx of pacemaker, sick sinus syndrome, asthma (denies any hospitalizations related to asthma), HTN, seizures last one 7 years ago on keppra and lamictal, hypothyroidism, breast cancer s/p left lumpectomy did not require chemotherapy or radiation 8-9years ago, presents with  neck pain for years, history of ACDF C6-C7 in 2002 with good results, however now presents with recurrent neck pain.  Patient describes neck pain as constant, sharp, dull at times,  6/10 in severity, worse with forward flexion and extension, relief with flexeril and percocet, has tried steroid injections and physical therapy without relief, intermittent tingling to right arm and hand, pt states she constantly drops items. Patient is now anterior cervical discectomy and fusion C6-C7 POD #03.     Plan:     Cervical spine stenosis  S/p ACDF C6-C7 POD #03  Pain control - patient reports she follows with pain management outpatient.  PT, drain has been d/khalif   Antibiotics, wound care, drain management and DVT prophylaxis as per primary team.  Incentive spirometry.  Avoid opioid induced constipation.    HTN  Continue Toprol and Amlodipine with parameters.    HLD  Continue statin.    Asthma  Patient reports she takes Albuterol prn.- stable     Seizure  Continue Keppra and Lamictal.  Seizure precautions.    Hypothyroid  Continue Synthroid.    H/o Breast cancer  S/p lumpectomy 8-9years ago, didn't need chemo and RT.    Urinary retention - hx of not being able to empty bladder -   likely postop exacerbation on chronic urinary retention,   started on Flomax, she is retaining ~600ml, s/p mcbride's placement, Urology consult or could put foleys in and have follow up with urology for further management in the office, UA done and has 2 to 3 wbs, denies burning or difficulty urination.     Prediabetes : preop A1C  5.8 , ADA  , life style changes and need to follow up with PMD in 3 months to check A1C and follow further recommendations     SOB and hypoxemia: CXR done shows fluid over load, will give dose of Ethacrynic acid 50mg one dose and will try weaning off supplement O2  she is not on diuretics at home, checked her out patient med list, she follow with cardiologist for her pace maker.     DVT prophylaxis  SCDs       
62 year old female with a pmhx of pacemaker, sick sinus syndrome, asthma (denies any hospitalizations related to asthma), HTN, seizures last one 7 years ago on keppra and lamictal, hypothyroidism, breast cancer s/p left lumpectomy did not require chemotherapy or radiation 8-9years ago, presents with  neck pain for years, history of ACDF C6-C7 in 2002 with good results, however now presents with recurrent neck pain.  Patient describes neck pain as constant, sharp, dull at times,  6/10 in severity, worse with forward flexion and extension, relief with flexeril and percocet, has tried steroid injections and physical therapy without relief, intermittent tingling to right arm and hand, pt states she constantly drops items. Patient is now anterior cervical discectomy and fusion C6-C7 POD #02.     Cervical spine stenosis  S/p ACDF C6-C7 POD #02  Pain control - patient reports she follows with pain management outpatient.  PT, drain has been d/khalif   Antibiotics, wound care, drain management and DVT prophylaxis as per primary team.  Incentive spirometry.  Avoid opioid induced constipation.    HTN  Continue Toprol and Amlodipine with parameters.    HLD  Continue statin.    Asthma  Patient reports she takes Albuterol prn.- stable     Seizure  Continue Keppra and Lamictal.  Seizure precautions.    Hypothyroid  Continue Synthroid.    H/o Breast cancer  S/p lumpectomy 8-9years ago, didn't need chemo and RT.    Urinary retention - hx of not being able to empty bladder -   likely postop exacerbation on chronic urinary retention,   started on Flomax, she is retaining ~600ml, will get Urology consult or could put foleys in and have follow up with urology for further management in the office, UA done and has 2 to 3 wbs, denies burning or difficulty urination.      Prediabetes : preop A1C  5.8 , ADA  , life style changes and need to follow up with PMD in 3 months to check A1C and follow further recommendations     DVT prophylaxis  SCDs     Patient is stable from the medicine point of view for discharge pending PT and Ortho eval, need to follow up with urology as out patient.    
62 year old female with a pmhx of pacemaker, sick sinus syndrome, asthma (denies any hospitalizations related to asthma), HTN, seizures last one 7 years ago on keppra and lamictal, hypothyroidism, breast cancer s/p left lumpectomy did not require chemotherapy or radiation 8-9years ago, presents with  neck pain for years, history of ACDF C6-C7 in 2002 with good results, however now presents with recurrent neck pain.  Patient describes neck pain as constant, sharp, dull at times,  6/10 in severity, worse with forward flexion and extension, relief with flexeril and percocet, has tried steroid injections and physical therapy without relief, intermittent tingling to right arm and hand, pt states she constantly drops items. Patient is now anterior cervical discectomy and fusion C6-C7 POD #03.     Cervical spine stenosis  S/p ACDF C6-C7 POD #03  Pain control - patient reports she follows with pain management outpatient.  PT, drain has been d/khalif   Antibiotics, wound care, drain management and DVT prophylaxis as per primary team.  Incentive spirometry.  Avoid opioid induced constipation.    HTN  Continue Toprol and Amlodipine with parameters.    HLD  Continue statin.    Asthma  Patient reports she takes Albuterol prn.- stable     Seizure  Continue Keppra and Lamictal.  Seizure precautions.    Hypothyroid  Continue Synthroid.    H/o Breast cancer  S/p lumpectomy 8-9years ago, didn't need chemo and RT.    Urinary retention - hx of not being able to empty bladder -   likely postop exacerbation on chronic urinary retention,   started on Flomax, she is retaining ~600ml, will get Urology consult or could put foleys in and have follow up with urology for further management in the office, UA done and has 2 to 3 wbs, denies burning or difficulty urination.      Prediabetes : preop A1C  5.8 , ADA  , life style changes and need to follow up with PMD in 3 months to check A1C and follow further recommendations     Volume over load: CXR with increase vascular markings, she require some O2, will give 1 dose of ethacrynic acid 50mg and see how she does, will wean off O2 if she does not require.     Possible FLORENCIA: patient have desaturation while sleep, need out patient sleep study, counselled for wait loss.      DVT prophylaxis  SCDs     
62 year old female with a pmhx of pacemaker, sick sinus syndrome, asthma (denies any hospitalizations related to asthma), HTN, seizures last one 7 years ago on keppra and lamictal, hypothyroidism, breast cancer s/p left lumpectomy did not require chemotherapy or radiation 8-9years ago, presents with  neck pain for years, history of ACDF C6-C7 in 2002 with good results, however now presents with recurrent neck pain.  Patient describes neck pain as constant, sharp, dull at times,  6/10 in severity, worse with forward flexion and extension, relief with flexeril and percocet, has tried steroid injections and physical therapy without relief, intermittent tingling to right arm and hand, pt states she constantly drops items. Patient is now anterior cervical discectomy and fusion C6-C7 POD #01.     Cervical spine stenosis  S/p ACDF C6-C7 POD #01  Pain control - patient reports she follows with pain management outpatient.  PT.  Antibiotics, wound care, drain management and DVT prophylaxis as per primary team.  Incentive spirometry.  Avoid opioid induced constipation.    HTN  Continue Toprol and Amlodipine with parameters.    HLD  Continue statin.    Asthma  Patient reports she takes Albuterol prn.- stable     Seizure  Continue Keppra and Lamictal.  Seizure precautions.    Hypothyroid  Continue Synthroid.    H/o Breast cancer  S/p lumpectomy 8-9years ago, didn't need chemo and RT.    Urinary retention - hx of not being able to empty bladder -   likely postop exacerbation on chronic urinary retention -   continue to bladder scan q6hrs , straight cath for PVR> 350 ml ,   started on Flomax - if continues to have PVR > 350 ml   till am - consider  eval and Priest cath     Prediabetes : preop A1C  5.8 , ADA  , life style changes and need to follow up with PMD in 3 months to check A1C and follow further recommendations     DVT prophylaxis  SCDs     Medicine will follow up along with you .

## 2022-06-20 NOTE — PROGRESS NOTE ADULT - SUBJECTIVE AND OBJECTIVE BOX
[facility]ON[facility]    [id number]    62y      Female    Patient is a 62y old  Female who presents with a chief complaint of Medical management (2022 15:01)      INTERVAL HPI/OVERNIGHT EVENTS:    Patient is doing ok, feels some improvement of SOB, denies fever, chills, chest pain    REVIEW OF SYSTEMS:    CONSTITUTIONAL: No fever, fatigue  RESPIRATORY: No cough, Improving shortness of breath  CARDIOVASCULAR: No chest pain, palpitations  GASTROINTESTINAL: No abdominal, No nausea, vomiting  NEUROLOGICAL: No headaches,  loss of strength.  MISCELLANEOUS: Neck pain is well controlled       Vital Signs Last 24 Hrs  T(C): 36.7 (2022 10:42), Max: 36.8 (2022 04:30)  T(F): 98 (2022 10:42), Max: 98.2 (2022 04:30)  HR: 86 (2022 10:42) (86 - 101)  BP: 139/97 (2022 10:42) (107/72 - 139/97)  RR: 18 (2022 10:42) (18 - 19)  SpO2: 96% (2022 10:42) (93% - 96%)    PHYSICAL EXAM:      GENERAL: Middle age female looking comfortable   HEENT: neck with dressings on, some soaking   NECK: soft, Supple, No JVD,   CHEST/LUNG: Decrease air entry bilaterally; No wheezing  HEART: S1S2+, Regular rate and rhythm; No murmurs  ABDOMEN: Soft, Nontender, Nondistended; Bowel sounds present  EXTREMITIES:  1+ Peripheral Pulses, No edema  SKIN: No rashes or lesions  NEURO: AAOX3, no focal deficits, no motor r sensory loss  PSYCH: normal mood      LABS:        Urinalysis Basic - ( 2022 12:09 )    Color: Yellow / Appearance: Clear / S.025 / pH: x  Gluc: x / Ketone: Negative  / Bili: Negative / Urobili: Negative mg/dL   Blood: x / Protein: Negative / Nitrite: Negative   Leuk Esterase: Negative / RBC: 6-10 /HPF / WBC 3-5 /HPF   Sq Epi: x / Non Sq Epi: Moderate / Bacteria: Few          I&O's Summary    2022 07:01  -  2022 07:00  --------------------------------------------------------  IN: 0 mL / OUT: 2650 mL / NET: -2650 mL        MEDICATIONS  (STANDING):  amLODIPine   Tablet 2.5 milliGRAM(s) Oral daily  ascorbic acid 500 milliGRAM(s) Oral two times a day  atorvastatin 20 milliGRAM(s) Oral at bedtime  cholecalciferol 2000 Unit(s) Oral daily  cyanocobalamin 500 MICROGram(s) Oral daily  diphenhydrAMINE 25 milliGRAM(s) Oral daily  folic acid 1 milliGRAM(s) Oral daily  lamoTRIgine 250 milliGRAM(s) Oral two times a day  levETIRAcetam 500 milliGRAM(s) Oral two times a day  levothyroxine 100 MICROGram(s) Oral daily  magnesium oxide 400 milliGRAM(s) Oral daily  metoprolol succinate ER 50 milliGRAM(s) Oral daily  pantoprazole    Tablet 40 milliGRAM(s) Oral before breakfast  polyethylene glycol 3350 17 Gram(s) Oral daily  senna 2 Tablet(s) Oral at bedtime  tamsulosin 0.4 milliGRAM(s) Oral at bedtime    MEDICATIONS  (PRN):  ALBUTerol    90 MICROgram(s) HFA Inhaler 2 Puff(s) Inhalation every 6 hours PRN Shortness of Breath and/or Wheezing  benzocaine 15 mG/menthol 3.6 mG Lozenge 1 Lozenge Oral three times a day PRN Sore Throat  cyclobenzaprine 10 milliGRAM(s) Oral three times a day PRN Muscle Spasm  EPINEPHrine    Injectable 0.9 milliGRAM(s) IV Push once PRN Allergic reaction  HYDROmorphone   Tablet 2 milliGRAM(s) Oral every 4 hours PRN Severe Pain (7 - 10)  magnesium hydroxide Suspension 30 milliLiter(s) Oral daily PRN Constipation  metoclopramide Injectable 10 milliGRAM(s) IV Push every 6 hours PRN Nausea and/or Vomiting  oxyCODONE    IR 10 milliGRAM(s) Oral every 4 hours PRN Moderate Pain (4 - 6)  oxyCODONE    IR 5 milliGRAM(s) Oral every 4 hours PRN Mild Pain (1 - 3)

## 2022-06-20 NOTE — DISCHARGE NOTE NURSING/CASE MANAGEMENT/SOCIAL WORK - PATIENT PORTAL LINK FT
You can access the FollowMyHealth Patient Portal offered by [facility] by registering at the following website: [email or web address]. By joining [facility]’s FollowMyHealth portal, you will also be able to view your health information using other applications (apps) compatible with our system.

## 2022-06-20 NOTE — DISCHARGE NOTE NURSING/CASE MANAGEMENT/SOCIAL WORK - NSDCPEFALRISK_GEN_ALL_CORE
For information on Fall & Injury Prevention, visit: [email or web address] OR  [email or web address] OR  https://www.cdc.gov/steadi/patient.html

## 2022-06-22 ENCOUNTER — APPOINTMENT (OUTPATIENT)
Dept: UROLOGY | Facility: CLINIC | Age: 62
End: 2022-06-22
Payer: MEDICARE

## 2022-06-22 PROCEDURE — 99203 OFFICE O/P NEW LOW 30 MIN: CPT

## 2022-06-22 RX ORDER — LEVOTHYROXINE SODIUM 0.12 MG/1
125 TABLET ORAL
Qty: 90 | Refills: 0 | Status: ACTIVE | COMMUNITY
Start: 2022-06-02

## 2022-06-22 RX ORDER — ATORVASTATIN CALCIUM 20 MG/1
20 TABLET, FILM COATED ORAL
Qty: 90 | Refills: 0 | Status: ACTIVE | COMMUNITY
Start: 2022-02-04

## 2022-06-22 RX ORDER — AMLODIPINE BESYLATE 2.5 MG/1
2.5 TABLET ORAL
Qty: 90 | Refills: 0 | Status: ACTIVE | COMMUNITY
Start: 2022-04-29

## 2022-06-22 RX ORDER — AMOXICILLIN 500 MG/1
500 TABLET, FILM COATED ORAL
Qty: 6 | Refills: 0 | Status: DISCONTINUED | COMMUNITY
Start: 2018-07-27 | End: 2022-06-22

## 2022-06-22 RX ORDER — LEVOTHYROXINE SODIUM 0.1 MG/1
100 TABLET ORAL
Refills: 0 | Status: DISCONTINUED | COMMUNITY
End: 2022-06-22

## 2022-06-22 RX ORDER — MUPIROCIN 20 MG/G
2 OINTMENT TOPICAL
Qty: 22 | Refills: 0 | Status: ACTIVE | COMMUNITY
Start: 2022-04-07

## 2022-06-22 RX ORDER — ATORVASTATIN CALCIUM 10 MG/1
10 TABLET, FILM COATED ORAL
Refills: 0 | Status: DISCONTINUED | COMMUNITY
End: 2022-06-22

## 2022-06-22 RX ORDER — ESLICARBAZEPINE ACETATE 600 MG/1
600 TABLET ORAL
Refills: 0 | Status: DISCONTINUED | COMMUNITY
End: 2022-06-22

## 2022-06-22 NOTE — HISTORY OF PRESENT ILLNESS
[FreeTextEntry1] : patient had a neck fusion and was in retention post op. Had  ml.  Prior to surgery she had some difficulty with post void urination.  No hematuria.  Had frequency intermittently.  Nocturia X 4.  Had a sleep apnea evaluation and she was borderline.  \par Since she had surgery she has some small amounts of walking. she is having constipation.  she has been a little bit of unsteadiness.  She      has only had minimal physical therapy post op.  \par \par [id number]

## 2022-06-22 NOTE — ASSESSMENT
[FreeTextEntry1] : Impression:\par \par post oo retention\par \par Plan:\par \par stop benadryl\par increase activity\par resolve constipation. \par follow up 10 days fopr a voiding trial.

## 2022-06-24 ENCOUNTER — APPOINTMENT (OUTPATIENT)
Dept: ORTHOPEDIC SURGERY | Facility: CLINIC | Age: 62
End: 2022-06-24
Payer: MEDICARE

## 2022-06-24 VITALS — WEIGHT: 293 LBS | BODY MASS INDEX: 50.02 KG/M2 | HEIGHT: 64 IN

## 2022-06-24 DIAGNOSIS — M81.0 AGE-RELATED OSTEOPOROSIS W/OUT CURRENT PATHOLOGICAL FRACTURE: ICD-10-CM

## 2022-06-24 PROCEDURE — 99024 POSTOP FOLLOW-UP VISIT: CPT

## 2022-06-24 PROCEDURE — 72040 X-RAY EXAM NECK SPINE 2-3 VW: CPT

## 2022-06-26 NOTE — PHYSICAL EXAM
[NL (45)] : right lateral flexion 45 degrees [NL (80)] : right lateral rotation 80 degrees [No loosening of hardware] : No loosening of hardware [de-identified] : Constitutional:\par -  General Appearance: \par Unremarkable\par Body Habitus\par Well Developed \par Well Nourished\par Body Habitus\par No Deformities\par Well Groomed\par \par Ability To communicate:\par Normal\par \par Neurologic: \par Global sensation is intact to upper and lower extremities.  See examination of Neck and/or Spine for exceptions.\par Orientation to Time, Place and Person is: Normal\par Mood And Affect is Normal\par \par Skin:\par -  Head/Face, Right Upper/Lower Extremity, Left Upper/Lower Extremity: Normal\par See Examination of Neck and/or Spine for exceptions\par \par Cardiovascular:\par Peripheral Cardiovascular System is Normal\par \par Palpation of Lymph Nodes:\par Normal Palpation of lymph nodes in: Axilla, Cervical, Inguinal\par Abnormal Palpation of lymph nodes in: None\par  [] : no sign of infection

## 2022-06-26 NOTE — DISCUSSION/SUMMARY
[Medication Risks Reviewed] : Medication risks reviewed [de-identified] : I advised the patient to take vitamin D and Calcium to improve bone density. Patient was advised to continue with cervical and lumbar home physical home therapy to work on conditioning and gait training., strengthening and range of motion. Patient was provided with a cervical and lumbar home exercise program. Will follow up in 3 weeks and assess suitability for outpatient PT.. Follow up with urology for urinary retention , mcbride mgmt post operatively.\par \par Prior to appointment and during encounter with patient extensive medical records were reviewed including but not limited to, hospital records, outpatient records, imaging results, and lab data. During this appointment the patient was examined, diagnoses were discussed and explained in a face to face manner. In addition extensive time was spent reviewing aforementioned diagnostic studies. Counseling including abnormal image results, differential diagnoses, treatment options, risk and benefits, lifestyle changes, current condition, and current medications was performed. Patient's comments, questions, and concerns were addressed and patient verbalized understanding. Based on this patient's presentation at our office, which is an orthopedic spine surgeon's office, this patient inherently / intrinsically has a risk, however minute, of developing issues such as Cauda equina syndrome, bowel and bladder changes, or progression of motor or neurological deficits such as paralysis which may be permanent.\par \par The documentation recorded by the scribe, in my presence, accurately reflects the service that I personally performed and the decisions made by me with my edits as appropriate.\par

## 2022-06-26 NOTE — DATA REVIEWED
[Cervical Spine] : cervical spine [I independently reviewed and interpreted images and report] : I independently reviewed and interpreted images and report [I reviewed the films/CD and additionally noted] : I reviewed the films/CD and additionally noted [FreeTextEntry1] : X-ray cervical spine reveal components well fixed.

## 2022-06-26 NOTE — HISTORY OF PRESENT ILLNESS
[4] : 4 [2] : 2 [de-identified] : 63 y/o female presents for a post operative visit of ACDF C5-6 (6/16/2022). Patient states UE pain has improved. Patient has started taking Percocet 7.5 mg/day to relieve symptoms. Patient states strength is returning in the BLE and BUE. Patient reports difficulty walking due to unsteady gait and is using a wheelchair. Patient is completing PT at home as best tolerated. Patient using catheter due to urinary retention. Patient is not taking medication for bone density. Denies paresthesias. \par \par \par

## 2022-06-29 DIAGNOSIS — R33.9 RETENTION OF URINE, UNSPECIFIED: ICD-10-CM

## 2022-06-29 RX ORDER — TAMSULOSIN HYDROCHLORIDE 0.4 MG/1
0.4 CAPSULE ORAL
Qty: 30 | Refills: 0 | Status: ACTIVE | COMMUNITY
Start: 2022-06-20 | End: 1900-01-01

## 2022-07-06 ENCOUNTER — APPOINTMENT (OUTPATIENT)
Dept: UROLOGY | Facility: CLINIC | Age: 62
End: 2022-07-06

## 2022-07-06 PROCEDURE — 51700 IRRIGATION OF BLADDER: CPT

## 2022-07-06 PROCEDURE — A4216: CPT | Mod: NC

## 2022-07-07 RX ORDER — GLUCOSAMINE/MSM/CHONDROIT SULF 500-166.6
595 (99 K) TABLET ORAL
Refills: 0 | Status: ACTIVE | COMMUNITY

## 2022-07-07 RX ORDER — OXYCODONE AND ACETAMINOPHEN 5; 325 MG/1; MG/1
5-325 TABLET ORAL
Qty: 90 | Refills: 0 | Status: DISCONTINUED | COMMUNITY
Start: 2022-06-03 | End: 2022-07-07

## 2022-07-07 RX ORDER — VITS A,C,E/LUTEIN/MINERALS 300MCG-200
200 TABLET ORAL
Refills: 0 | Status: ACTIVE | COMMUNITY

## 2022-07-07 RX ORDER — OXYCODONE AND ACETAMINOPHEN 7.5; 325 MG/1; MG/1
7.5-325 TABLET ORAL
Qty: 90 | Refills: 0 | Status: ACTIVE | COMMUNITY
Start: 2022-06-23

## 2022-07-07 RX ORDER — DOCUSATE SODIUM 50 MG SENNOSIDES 8.6 MG 8.6; 5 MG/1; MG/1
8.6-5 TABLET, FILM COATED ORAL
Qty: 30 | Refills: 0 | Status: ACTIVE | COMMUNITY
Start: 2022-06-20

## 2022-07-07 RX ORDER — OXYCODONE 5 MG/1
5 TABLET ORAL
Refills: 0 | Status: DISCONTINUED | COMMUNITY
End: 2022-07-07

## 2022-07-14 ENCOUNTER — APPOINTMENT (OUTPATIENT)
Dept: UROLOGY | Facility: CLINIC | Age: 62
End: 2022-07-14

## 2022-07-18 ENCOUNTER — APPOINTMENT (OUTPATIENT)
Dept: ORTHOPEDIC SURGERY | Facility: CLINIC | Age: 62
End: 2022-07-18

## 2022-07-18 VITALS — HEIGHT: 64 IN | BODY MASS INDEX: 50.02 KG/M2 | WEIGHT: 293 LBS

## 2022-07-18 PROCEDURE — 99024 POSTOP FOLLOW-UP VISIT: CPT

## 2022-07-19 NOTE — HISTORY OF PRESENT ILLNESS
[4] : 4 [7] : 7 [Radiating] : radiating [] : yes [Disabled] : Work status: disabled [de-identified] : 61 y/o female presents for a post operative visit of ACDF C5-6 (6/16/2022). Patient states UE pain has improved. Patient has started taking Percocet 7.5 mg/day to relieve symptoms. Patient states strength is returning in the BLE and BUE. Patient reports difficulty walking due to unsteady gait. Patient no longer using catheter due to urinary retention. Patient is not taking medication for bone density. Denies paresthesias. Onset of increased neck/shoulder pain approx 3 weeks ago (06/27/2022). Patent has not started PT. Patient has not been walking. Reports mobility slowly improving without use of walker. \par \par \par \par \par  [FreeTextEntry5] : about 3 weeks ago started to feel pain again in the back of the neck going into the rt shoulder [FreeTextEntry7] : rt shoulder [de-identified] : use heating packs on neck & rt shoulder

## 2022-07-19 NOTE — PHYSICAL EXAM
[Flexion] : flexion [Extension] : extension [No loosening of hardware] : No loosening of hardware [de-identified] : Constitutional:\par -  General Appearance: \par Unremarkable\par Body Habitus\par Well Developed \par Well Nourished\par Body Habitus\par No Deformities\par Well Groomed\par \par Ability To communicate:\par Normal\par \par Neurologic: \par Global sensation is intact to upper and lower extremities.  See examination of Neck and/or Spine for exceptions.\par Orientation to Time, Place and Person is: Normal\par Mood And Affect is Normal\par \par Skin:\par -  Head/Face, Right Upper/Lower Extremity, Left Upper/Lower Extremity: Normal\par See Examination of Neck and/or Spine for exceptions\par \par Cardiovascular:\par Peripheral Cardiovascular System is Normal\par \par Palpation of Lymph Nodes:\par Normal Palpation of lymph nodes in: Axilla, Cervical, Inguinal\par Abnormal Palpation of lymph nodes in: None\par  [] : full ROM with pain [FreeTextEntry9] : Limited ROM. Rotator cuff tendonitis (RT)

## 2022-07-19 NOTE — DISCUSSION/SUMMARY
[Medication Risks Reviewed] : Medication risks reviewed [de-identified] : Patient was prescribed cervical and shoulder out patient physical  therapy 2 x week to work on conditioning and gait training., strengthening and range of motion. Patient was provided with a cervical and home exercise program. Will follow up 5-6 weeks for repeat x-ray to eval bone healing. \par \par Prior to appointment and during encounter with patient extensive medical records were reviewed including but not limited to, hospital records, outpatient records, imaging results, and lab data. During this appointment the patient was examined, diagnoses were discussed and explained in a face to face manner. In addition extensive time was spent reviewing aforementioned diagnostic studies. Counseling including abnormal image results, differential diagnoses, treatment options, risk and benefits, lifestyle changes, current condition, and current medications was performed. Patient's comments, questions, and concerns were addressed and patient verbalized understanding. Based on this patient's presentation at our office, which is an orthopedic spine surgeon's office, this patient inherently / intrinsically has a risk, however minute, of developing issues such as Cauda equina syndrome, bowel and bladder changes, or progression of motor or neurological deficits such as paralysis which may be permanent.\par \par ORA LEARY Acting as a Scribe for Dr. Mitch ALEXANDER, Ora Leary, attest that this documentation has been prepared under the direction and in the presence of Provider Yg Frost MD. \par

## 2022-08-16 NOTE — PATIENT PROFILE ADULT - NSPROHMSYMPCOND_GEN_A_NUR
Pt presents for zometa infusion parameters checked and verified by pharmacy, ports labs drawn, pt reports fatigue and general malaise, states she is eating and drinking, instructed to call office if symptoms worsen, tolerated infusion, AVS declined is aware of next appt, d/c from unit stable Pre op teaching surgical scrub pain management and covid swab instructions given to pt/none Pre op teaching surgical scrub pain management and covid swab instructions given to pt    Spine surgery booklet given to pt  Pt advised to call Drs office with any questions/none

## 2022-08-29 ENCOUNTER — APPOINTMENT (OUTPATIENT)
Dept: ORTHOPEDIC SURGERY | Facility: CLINIC | Age: 62
End: 2022-08-29

## 2022-08-29 VITALS — BODY MASS INDEX: 50.02 KG/M2 | WEIGHT: 293 LBS | HEIGHT: 64 IN

## 2022-08-29 DIAGNOSIS — M48.02 SPINAL STENOSIS, CERVICAL REGION: ICD-10-CM

## 2022-08-29 PROCEDURE — 99024 POSTOP FOLLOW-UP VISIT: CPT

## 2022-08-29 PROCEDURE — 72040 X-RAY EXAM NECK SPINE 2-3 VW: CPT

## 2022-08-29 NOTE — DATA REVIEWED
[Cervical Spine] : cervical spine [I independently reviewed and interpreted images and report] : I independently reviewed and interpreted images and report [I reviewed the films/CD and additionally noted] : I reviewed the films/CD and additionally noted [FreeTextEntry1] : In office xrays taken today demonstrate good maintenance of alignment and implant placement, progress to fusion

## 2022-08-29 NOTE — HISTORY OF PRESENT ILLNESS
[de-identified] : 61 y/o female presents for a post operative visit of ACDF C5-6 (6/16/2022). Awaiting approval to start PT. Patient states UE pain has improved. Patient has started taking Percocet 7.5 mg/day to relieve symptoms. Patient states strength is returning in the BLE and BUE. Patient reports difficulty walking due to unsteady gait. Patient no longer using catheter due to urinary retention. Patient is not taking medication for bone density. Denies paresthesias. Onset of increased neck/shoulder pain  (06/27/2022). ROM in neck is slowly improving, but still limited. Patient has not been walking. Reports mobility slowly improving without use of walker. Swallowing has improved. She is pleased with her progress post op. \par \par \par \par \par

## 2022-08-29 NOTE — DISCUSSION/SUMMARY
[Medication Risks Reviewed] : Medication risks reviewed [de-identified] : Patient was advised to start PT / continue ROM exercises in order to further advance recovery. Patient was prescribed cervical and shoulder out patient physical  therapy 2 x week to work on conditioning and gait training, strengthening and range of motion. Patient was provided with a cervical and home exercise program. Will follow up for repeat x-ray to eval bone healing. \par \par Prior to appointment and during encounter with patient extensive medical records were reviewed including but not limited to, hospital records, outpatient records, imaging results, and lab data. During this appointment the patient was examined, diagnoses were discussed and explained in a face to face manner. In addition extensive time was spent reviewing aforementioned diagnostic studies. Counseling including abnormal image results, differential diagnoses, treatment options, risk and benefits, lifestyle changes, current condition, and current medications was performed. Patient's comments, questions, and concerns were addressed and patient verbalized understanding. Based on this patient's presentation at our office, which is an orthopedic spine surgeon's office, this patient inherently / intrinsically has a risk, however minute, of developing issues such as Cauda equina syndrome, bowel and bladder changes, or progression of motor or neurological deficits such as paralysis which may be permanent.\par \par ORA LEARY Acting as a Scribe for Dr. Frost\madi I, Ora Leary, attest that this documentation has been prepared under the direction and in the presence of Provider Yg Frost MD. \par

## 2022-08-29 NOTE — PHYSICAL EXAM
[Flexion] : flexion [Extension] : extension [5___] : right grasp 5[unfilled]/5 [Implants in position] : Implants in position [No loosening of hardware] : No loosening of hardware [de-identified] : Constitutional:\par -  General Appearance: \par Unremarkable\par Body Habitus\par Well Developed \par Well Nourished\par Body Habitus\par No Deformities\par Well Groomed\par \par Ability To communicate:\par Normal\par \par Neurologic: \par Global sensation is intact to upper and lower extremities.  See examination of Neck and/or Spine for exceptions.\par Orientation to Time, Place and Person is: Normal\par Mood And Affect is Normal\par \par Skin:\par -  Head/Face, Right Upper/Lower Extremity, Left Upper/Lower Extremity: Normal\par See Examination of Neck and/or Spine for exceptions\par \par Cardiovascular:\par Peripheral Cardiovascular System is Normal\par \par Palpation of Lymph Nodes:\par Normal Palpation of lymph nodes in: Axilla, Cervical, Inguinal\par Abnormal Palpation of lymph nodes in: None\par  [] : full ROM with pain [FreeTextEntry1] : Good progress toward fusion

## 2022-10-07 NOTE — LETTER BODY
[Dear  ___] : Dear  [unfilled], [Courtesy Letter:] : I had the pleasure of seeing your patient, [unfilled], in my office today. [Please see my note below.] : Please see my note below. [Sincerely,] : Sincerely, [FreeTextEntry3] : Ed\par \par John Seo MD\par [facility]\par  of Urology\par [employer] [facility]\par  Detail Level: Zone

## 2022-11-11 ENCOUNTER — APPOINTMENT (OUTPATIENT)
Dept: ORTHOPEDIC SURGERY | Facility: CLINIC | Age: 62
End: 2022-11-11

## 2022-11-11 VITALS — WEIGHT: 278 LBS | BODY MASS INDEX: 47.46 KG/M2 | HEIGHT: 64 IN

## 2022-11-11 PROCEDURE — 72040 X-RAY EXAM NECK SPINE 2-3 VW: CPT

## 2022-11-11 PROCEDURE — 99213 OFFICE O/P EST LOW 20 MIN: CPT

## 2022-11-11 NOTE — PHYSICAL EXAM
[Flexion] : flexion [Extension] : extension [5___] : right grasp 5[unfilled]/5 [Implants in position] : Implants in position [No loosening of hardware] : No loosening of hardware [Normal Coordination] : normal coordination [Normal DTR UE/LE] : normal DTR UE/LE  [Normal Sensation] : normal sensation [Normal Mood and Affect] : normal mood and affect [Orientated] : orientated [Able to Communicate] : able to communicate [Normal Skin] : normal skin [No Rash] : no rash [No Ulcers] : no ulcers [No Lesions] : no lesions [No obvious lymphadenopathy in areas examined] : no obvious lymphadenopathy in areas examined [Well Developed] : well developed [Well Nourished] : well nourished [Peripheral vascular exam is grossly normal] : peripheral vascular exam is grossly normal [No Respiratory Distress] : no respiratory distress [de-identified] : Constitutional:\par -  General Appearance: \par Unremarkable\par Body Habitus\par Well Developed \par Well Nourished\par Body Habitus\par No Deformities\par Well Groomed\par \par Ability To communicate:\par Normal\par \par Neurologic: \par Global sensation is intact to upper and lower extremities.  See examination of Neck and/or Spine for exceptions.\par Orientation to Time, Place and Person is: Normal\par Mood And Affect is Normal\par \par Skin:\par -  Head/Face, Right Upper/Lower Extremity, Left Upper/Lower Extremity: Normal\par See Examination of Neck and/or Spine for exceptions\par \par Cardiovascular:\par Peripheral Cardiovascular System is Normal\par \par Palpation of Lymph Nodes:\par Normal Palpation of lymph nodes in: Axilla, Cervical, Inguinal\par Abnormal Palpation of lymph nodes in: None\par  [] : full ROM with pain [FreeTextEntry1] : Good progress toward fusion

## 2022-11-11 NOTE — DISCUSSION/SUMMARY
[Medication Risks Reviewed] : Medication risks reviewed [de-identified] : Discussed and reviewed results of cervical x-ray taken in office. Patient was advised to continue ROM exercises in order to further advance recovery. Continuation of cervical and home exercise program. Discussed judicious use otc nsaids and hot/cold compresses prn. Patient will f/u in 2 months. \par \par Prior to appointment and during encounter with patient extensive medical records were reviewed including but not limited to, hospital records, outpatient records, imaging results, and lab data. During this appointment the patient was examined, diagnoses were discussed and explained in a face to face manner. In addition extensive time was spent reviewing aforementioned diagnostic studies. Counseling including abnormal image results, differential diagnoses, treatment options, risk and benefits, lifestyle changes, current condition, and current medications was performed. Patient's comments, questions, and concerns were addressed and patient verbalized understanding. Based on this patient's presentation at our office, which is an orthopedic spine surgeon's office, this patient inherently / intrinsically has a risk, however minute, of developing issues such as Cauda equina syndrome, bowel and bladder changes, or progression of motor or neurological deficits such as paralysis which may be permanent.\par \par ORA LEARY Acting as a Scribe for Dr. Mitch ALEXANDER, Ora Leary, attest that this documentation has been prepared under the direction and in the presence of Provider Yg Frost MD. \par

## 2022-11-11 NOTE — HISTORY OF PRESENT ILLNESS
[0] : 0 [2] : 2 [Disabled] : Work status: disabled [de-identified] : 11/11/2022 - 63 y/o female presents for a post operative visit of ACDF C5-6 (6/16/2022). Patient is doing very well post op. She reports significant improvement in arm pain. Completing home exercise program, she states 50 lb weight loss. Ambulation significantly improved. Episodic pain/tightness in the neck, but tolerable. Using Advil/ Tylenol prn. \par \par . [de-identified] : finished p/t treatment

## 2022-12-12 ENCOUNTER — APPOINTMENT (OUTPATIENT)
Dept: OBGYN | Facility: CLINIC | Age: 62
End: 2022-12-12

## 2023-03-31 ENCOUNTER — APPOINTMENT (OUTPATIENT)
Dept: ORTHOPEDIC SURGERY | Facility: CLINIC | Age: 63
End: 2023-03-31
Payer: MEDICARE

## 2023-03-31 VITALS — WEIGHT: 278 LBS | BODY MASS INDEX: 47.46 KG/M2 | HEIGHT: 64 IN

## 2023-03-31 PROCEDURE — 72040 X-RAY EXAM NECK SPINE 2-3 VW: CPT

## 2023-03-31 PROCEDURE — 99213 OFFICE O/P EST LOW 20 MIN: CPT

## 2023-03-31 NOTE — DATA REVIEWED
[Cervical Spine] : cervical spine [I independently reviewed and interpreted images and report] : I independently reviewed and interpreted images and report [I reviewed the films/CD and additionally noted] : I reviewed the films/CD and additionally noted [FreeTextEntry1] : In office xrays taken today demonstrate apparent solid fusion. no change in alignment compared to last xray.

## 2023-03-31 NOTE — HISTORY OF PRESENT ILLNESS
[0] : 0 [2] : 2 [Disabled] : Work status: disabled [de-identified] : 3/31/2023 -  63 y/o female presents for a post operative visit of ACDF C5-6 (6/16/2022). C/o RT sided neck discomfort starting 3 months ago. No trauma or injury. Pain radiates into the rt trapezius/shoulder. No return of radicular UE pain or change in UE strength. Severity of pain ranges day to day.  Treating with hot compresses which does provide relief. \par \par 11/11/2022 - 63 y/o female presents for a post operative visit of ACDF C5-6 (6/16/2022). Patient is doing very well post op. She reports significant improvement in arm pain. Completing home exercise program, she states 50 lb weight loss. Ambulation significantly improved. Episodic pain/tightness in the neck, but tolerable. Using Advil/ Tylenol prn. \par \par Injury Details: \par On a scale of 2-3/10 -> neck pain\par On a scale of 1/10 -> neck pain\par Treatment Since Last Visit: finished p/t treatment. \par Work Details: \par Work status: disabled. \par  [de-identified] : finished p/t treatment

## 2023-03-31 NOTE — DISCUSSION/SUMMARY
[Medication Risks Reviewed] : Medication risks reviewed [de-identified] : Discussed and reviewed results of cervical x-ray taken in office. Patient was advised to continue ROM exercises in order to further advance recovery. Continuation of cervical and home exercise program. Discussed judicious use otc nsaids and hot/cold compresses prn.\par \par Prior to appointment and during encounter with patient extensive medical records were reviewed including but not limited to, hospital records, outpatient records, imaging results, and lab data. During this appointment the patient was examined, diagnoses were discussed and explained in a face to face manner. In addition extensive time was spent reviewing aforementioned diagnostic studies. Counseling including abnormal image results, differential diagnoses, treatment options, risk and benefits, lifestyle changes, current condition, and current medications was performed. Patient's comments, questions, and concerns were addressed and patient verbalized understanding. Based on this patient's presentation at our office, which is an orthopedic spine surgeon's office, this patient inherently / intrinsically has a risk, however minute, of developing issues such as Cauda equina syndrome, bowel and bladder changes, or progression of motor or neurological deficits such as paralysis which may be permanent.\par \par ORA LEARY Acting as a Scribe for Dr. Frost\madi ALEXANDER, Ora Leary, attest that this documentation has been prepared under the direction and in the presence of Provider Yg Frost MD. \par

## 2023-03-31 NOTE — PHYSICAL EXAM
[] : sensation of right upper extremities intact [Normal Coordination] : normal coordination [Normal DTR UE/LE] : normal DTR UE/LE  [Normal Sensation] : normal sensation [Normal Mood and Affect] : normal mood and affect [Orientated] : orientated [Able to Communicate] : able to communicate [Normal Skin] : normal skin [No Rash] : no rash [No Ulcers] : no ulcers [No Lesions] : no lesions [No obvious lymphadenopathy in areas examined] : no obvious lymphadenopathy in areas examined [Well Developed] : well developed [Well Nourished] : well nourished [Peripheral vascular exam is grossly normal] : peripheral vascular exam is grossly normal [No Respiratory Distress] : no respiratory distress

## 2023-07-21 ENCOUNTER — APPOINTMENT (OUTPATIENT)
Dept: ORTHOPEDIC SURGERY | Facility: CLINIC | Age: 63
End: 2023-07-21

## 2023-08-30 ENCOUNTER — APPOINTMENT (OUTPATIENT)
Dept: ORTHOPEDIC SURGERY | Facility: CLINIC | Age: 63
End: 2023-08-30
Payer: MEDICARE

## 2023-08-30 VITALS — HEIGHT: 64 IN | BODY MASS INDEX: 47.46 KG/M2 | WEIGHT: 278 LBS

## 2023-08-30 PROCEDURE — 72040 X-RAY EXAM NECK SPINE 2-3 VW: CPT

## 2023-08-30 PROCEDURE — 99213 OFFICE O/P EST LOW 20 MIN: CPT

## 2023-09-05 NOTE — DATA REVIEWED
Kenyon... [I independently reviewed and interpreted images and report] : I independently reviewed and interpreted images and report [I reviewed the films/CD and additionally noted] : I reviewed the films/CD and additionally noted [FreeTextEntry1] : I stop paperwork reviewed

## 2023-09-05 NOTE — PHYSICAL EXAM
[Normal Coordination] : normal coordination [Normal DTR UE/LE] : normal DTR UE/LE  [Normal Sensation] : normal sensation [Normal Mood and Affect] : normal mood and affect [Orientated] : orientated [Able to Communicate] : able to communicate [Normal Skin] : normal skin [No Rash] : no rash [No Ulcers] : no ulcers [No Lesions] : no lesions [No obvious lymphadenopathy in areas examined] : no obvious lymphadenopathy in areas examined [Well Developed] : well developed [Well Nourished] : well nourished [Peripheral vascular exam is grossly normal] : peripheral vascular exam is grossly normal [No Respiratory Distress] : no respiratory distress [] : no rhomboid tenderness [FreeTextEntry9] : stiffness all planes

## 2023-09-05 NOTE — DISCUSSION/SUMMARY
[Medication Risks Reviewed] : Medication risks reviewed [de-identified] : Discussed and reviewed results of cervical x-ray taken in office. Patient was advised to continue ROM exercises in order to further advance recovery. Continuation of cervical and home exercise program. Discussed judicious use otc nsaids and hot/cold compresses prn. FUV 1 year.  Prior to appointment and during encounter with patient extensive medical records were reviewed including but not limited to, hospital records, outpatient records, imaging results, and lab data. During this appointment the patient was examined, diagnoses were discussed and explained in a face to face manner. In addition extensive time was spent reviewing aforementioned diagnostic studies. Counseling including abnormal image results, differential diagnoses, treatment options, risk and benefits, lifestyle changes, current condition, and current medications was performed. Patient's comments, questions, and concerns were addressed and patient verbalized understanding. Based on this patient's presentation at our office, which is an orthopedic spine surgeon's office, this patient inherently / intrinsically has a risk, however minute, of developing issues such as Cauda equina syndrome, bowel and bladder changes, or progression of motor or neurological deficits such as paralysis which may be permanent.  ORA LEARY Acting as a Scribe for Dr. Santosh ALEXANDER, Ora Leary, attest that this documentation has been prepared under the direction and in the presence of Provider Yg Frost MD.

## 2023-09-05 NOTE — HISTORY OF PRESENT ILLNESS
[0] : 0 [2] : 2 [Disabled] : Work status: disabled [de-identified] : 08/30/2023 - Patient presenting for a follow up visit, she is s/p  ACDF C5-6 (6/16/2022). Post operatively she is pleased with her progress from surgery. Since she was last seen she was hospitalized due to UTI. PT has returned home; neck and back pain is tolerable at this time. She denies any pain in the arms. C/o mild stiffness, she continues home stretching. Outside of UTI, general medical health has been stable.   3/31/2023 -  63 y/o female presents for a post operative visit of ACDF C5-6 (6/16/2022). C/o RT sided neck discomfort starting 3 months ago. No trauma or injury. Pain radiates into the rt trapezius/shoulder. No return of radicular UE pain or change in UE strength. Severity of pain ranges day to day.  Treating with hot compresses which does provide relief.   11/11/2022 - 63 y/o female presents for a post operative visit of ACDF C5-6 (6/16/2022). Patient is doing very well post op. She reports significant improvement in arm pain. Completing home exercise program, she states 50 lb weight loss. Ambulation significantly improved. Episodic pain/tightness in the neck, but tolerable. Using Advil/ Tylenol prn.   Injury Details:  On a scale of 2-3/10 -> neck pain On a scale of 1/10 -> neck pain Treatment Since Last Visit: finished p/t treatment.  Work Details:  Work status: disabled.   [de-identified] : finished p/t treatment

## 2023-09-21 ENCOUNTER — APPOINTMENT (OUTPATIENT)
Dept: GASTROENTEROLOGY | Facility: CLINIC | Age: 63
End: 2023-09-21
Payer: MEDICARE

## 2023-09-21 DIAGNOSIS — K76.0 FATTY (CHANGE OF) LIVER, NOT ELSEWHERE CLASSIFIED: ICD-10-CM

## 2023-09-21 PROCEDURE — 91200 LIVER ELASTOGRAPHY: CPT

## 2024-02-02 ENCOUNTER — APPOINTMENT (OUTPATIENT)
Dept: ORTHOPEDIC SURGERY | Facility: CLINIC | Age: 64
End: 2024-02-02
Payer: MEDICARE

## 2024-02-02 VITALS — BODY MASS INDEX: 47.46 KG/M2 | WEIGHT: 278 LBS | HEIGHT: 64 IN

## 2024-02-02 DIAGNOSIS — M47.812 SPONDYLOSIS W/OUT MYELOPATHY OR RADICULOPATHY, CERVICAL REGION: ICD-10-CM

## 2024-02-02 PROCEDURE — 99213 OFFICE O/P EST LOW 20 MIN: CPT

## 2024-02-02 PROCEDURE — 72040 X-RAY EXAM NECK SPINE 2-3 VW: CPT

## 2024-02-04 NOTE — HISTORY OF PRESENT ILLNESS
[0] : 0 [2] : 2 [Disabled] : Work status: disabled [de-identified] : 02/02/2024 - Patient has returned for a follow-up appointment, reporting predominantly persistent neck pain that initiated in October 2023. There is no radiating pain into the arms; however, the patient experiences occasional numbness in the arm and infrequent focal shoulder pain. She has pace maker. No change to general medical health.   08/30/2023 - Patient presenting for a follow up visit, she is s/p  ACDF C5-6 (6/16/2022). Post operatively she is pleased with her progress from surgery. Since she was last seen she was hospitalized due to UTI. PT has returned home; neck and back pain is tolerable at this time. She denies any pain in the arms. C/o mild stiffness, she continues home stretching. Outside of UTI, general medical health has been stable.   3/31/2023 -  63 y/o female presents for a post operative visit of ACDF C5-6 (6/16/2022). C/o RT sided neck discomfort starting 3 months ago. No trauma or injury. Pain radiates into the rt trapezius/shoulder. No return of radicular UE pain or change in UE strength. Severity of pain ranges day to day.  Treating with hot compresses which does provide relief.   11/11/2022 - 63 y/o female presents for a post operative visit of ACDF C5-6 (6/16/2022). Patient is doing very well post op. She reports significant improvement in arm pain. Completing home exercise program, she states 50 lb weight loss. Ambulation significantly improved. Episodic pain/tightness in the neck, but tolerable. Using Advil/ Tylenol prn.   Injury Details:  On a scale of 2-3/10 -> neck pain On a scale of 1/10 -> neck pain Treatment Since Last Visit: finished p/t treatment.  Work Details:  Work status: disabled.   [de-identified] : finished p/t treatment

## 2024-02-04 NOTE — DISCUSSION/SUMMARY
[Medication Risks Reviewed] : Medication risks reviewed [de-identified] : Discussed and reviewed results of cervical x-ray taken in office - stable post op changes C5-7 with mild kyphosis at C4/5. Patient was advised to continue ROM exercises in order to further advance recovery. Continuation of cervical and home exercise program. Discussed judicious use otc nsaids and hot/cold compresses prn. A CT scan of the cervical spine has been requested due to the patient's inability to undergo an MRI owing to the presence of a pacemaker, the patient has been enduring persistent neck pain and arm numbness, with a history of anterior cervical discectomy and fusion in 2022. She will follow up after CT.   Prior to appointment and during encounter with patient extensive medical records were reviewed including but not limited to, hospital records, outpatient records, imaging results, and lab data. During this appointment the patient was examined, diagnoses were discussed and explained in a face to face manner. In addition extensive time was spent reviewing aforementioned diagnostic studies. Counseling including abnormal image results, differential diagnoses, treatment options, risk and benefits, lifestyle changes, current condition, and current medications was performed. Patient's comments, questions, and concerns were addressed and patient verbalized understanding. Based on this patient's presentation at our office, which is an orthopedic spine surgeon's office, this patient inherently / intrinsically has a risk, however minute, of developing issues such as Cauda equina syndrome, bowel and bladder changes, or progression of motor or neurological deficits such as paralysis which may be permanent.  ORA LEARY Acting as a Scribe for Dr. Santosh ALEXANDER, Ora Leary, attest that this documentation has been prepared under the direction and in the presence of Provider Yg Frost MD.

## 2024-02-04 NOTE — PHYSICAL EXAM
[Normal Coordination] : normal coordination [Normal DTR UE/LE] : normal DTR UE/LE  [Normal Sensation] : normal sensation [Normal Mood and Affect] : normal mood and affect [Orientated] : orientated [Able to Communicate] : able to communicate [Normal Skin] : normal skin [No Rash] : no rash [No Ulcers] : no ulcers [No Lesions] : no lesions [No obvious lymphadenopathy in areas examined] : no obvious lymphadenopathy in areas examined [Well Developed] : well developed [Well Nourished] : well nourished [Peripheral vascular exam is grossly normal] : peripheral vascular exam is grossly normal [No Respiratory Distress] : no respiratory distress [] : no rhomboid spasm [FreeTextEntry9] : stiffness all planes

## 2024-02-16 ENCOUNTER — APPOINTMENT (OUTPATIENT)
Dept: ORTHOPEDIC SURGERY | Facility: CLINIC | Age: 64
End: 2024-02-16

## 2024-02-23 ENCOUNTER — RESULT REVIEW (OUTPATIENT)
Age: 64
End: 2024-02-23

## 2024-03-20 ENCOUNTER — APPOINTMENT (OUTPATIENT)
Dept: ORTHOPEDIC SURGERY | Facility: CLINIC | Age: 64
End: 2024-03-20
Payer: MEDICARE

## 2024-03-20 VITALS — WEIGHT: 278 LBS | HEIGHT: 64 IN | BODY MASS INDEX: 47.46 KG/M2

## 2024-03-20 DIAGNOSIS — M54.12 RADICULOPATHY, CERVICAL REGION: ICD-10-CM

## 2024-03-20 PROCEDURE — 99213 OFFICE O/P EST LOW 20 MIN: CPT

## 2024-03-21 NOTE — HISTORY OF PRESENT ILLNESS
[0] : 0 [2] : 2 [Disabled] : Work status: disabled [de-identified] : 03/20/2024 - Pt presents to review cervical CT. She is s/p ACDF C5-6 (6/16/2022). Complaints of neck and intermittent arm pain. No change to general medical health. Ambulating as tolerated without limitations.   02/02/2024 - Patient has returned for a follow-up appointment, reporting predominantly persistent neck pain that initiated in October 2023. There is no radiating pain into the arms; however, the patient experiences occasional numbness in the arm and infrequent focal shoulder pain. She has pace maker. No change to general medical health.   08/30/2023 - Patient presenting for a follow up visit, she is s/p  ACDF C5-6 (6/16/2022). Post operatively she is pleased with her progress from surgery. Since she was last seen she was hospitalized due to UTI. PT has returned home; neck and back pain is tolerable at this time. She denies any pain in the arms. C/o mild stiffness, she continues home stretching. Outside of UTI, general medical health has been stable.   3/31/2023 -  61 y/o female presents for a post operative visit of ACDF C5-6 (6/16/2022). C/o RT sided neck discomfort starting 3 months ago. No trauma or injury. Pain radiates into the rt trapezius/shoulder. No return of radicular UE pain or change in UE strength. Severity of pain ranges day to day.  Treating with hot compresses which does provide relief.   11/11/2022 - 61 y/o female presents for a post operative visit of ACDF C5-6 (6/16/2022). Patient is doing very well post op. She reports significant improvement in arm pain. Completing home exercise program, she states 50 lb weight loss. Ambulation significantly improved. Episodic pain/tightness in the neck, but tolerable. Using Advil/ Tylenol prn.   Injury Details:  On a scale of 2-3/10 -> neck pain On a scale of 1/10 -> neck pain Treatment Since Last Visit: finished p/t treatment.  Work Details:  Work status: disabled.   [de-identified] : finished p/t treatment

## 2024-03-21 NOTE — DATA REVIEWED
[I independently reviewed and interpreted images and report] : I independently reviewed and interpreted images and report [Cervical Spine] : cervical spine [CT Scan] : CT scan [Report was reviewed and noted in the chart] : The report was reviewed and noted in the chart [I reviewed the films/CD and additionally noted] : I reviewed the films/CD and additionally noted [FreeTextEntry1] : I stop paperwork reviewed

## 2024-03-21 NOTE — DISCUSSION/SUMMARY
[Medication Risks Reviewed] : Medication risks reviewed [de-identified] : CT was discucssed and reviewed today. Patient was advised to continue ROM exercises in order to further advance recovery. Continuation of cervical and home exercise program. Discussed judicious use otc nsaids and hot/cold compresses prn. She defers any interventional injections at this time as her pain is not significant enough. Referred to PT directed at the cervical spine.   Prior to appointment and during encounter with patient extensive medical records were reviewed including but not limited to, hospital records, outpatient records, imaging results, and lab data. During this appointment the patient was examined, diagnoses were discussed and explained in a face to face manner. In addition extensive time was spent reviewing aforementioned diagnostic studies. Counseling including abnormal image results, differential diagnoses, treatment options, risk and benefits, lifestyle changes, current condition, and current medications was performed. Patient's comments, questions, and concerns were addressed and patient verbalized understanding. Based on this patient's presentation at our office, which is an orthopedic spine surgeon's office, this patient inherently / intrinsically has a risk, however minute, of developing issues such as Cauda equina syndrome, bowel and bladder changes, or progression of motor or neurological deficits such as paralysis which may be permanent.  ORA LEARY Acting as a Scribe for Dr. Santosh ALEXANDER, Ora Leary, attest that this documentation has been prepared under the direction and in the presence of Provider Yg Frost MD.

## 2024-03-21 NOTE — PHYSICAL EXAM
Problem: Patient Care Overview  Goal: Plan of Care/Patient Progress Review  Outcome: Improving  A&O. HR up to 140s, Tele A fib. On Amio gtt, cards aware. BP down to 80s/50s early in shift, now SBP 90s-100s. O2 sats stable, O2 weaned to 1L n/c. Denies pain. LS diminished. Up w/ lift to BSC. Bladder scanned for 373 mL. Went 200 mL. 1 BM this shift.        [Normal Coordination] : normal coordination [Normal DTR UE/LE] : normal DTR UE/LE  [Normal Mood and Affect] : normal mood and affect [Normal Sensation] : normal sensation [Oriented] : oriented [Able to Communicate] : able to communicate [No Rash] : no rash [Normal Skin] : normal skin [No Lesions] : no lesions [No Ulcers] : no ulcers [Well Developed] : well developed [No obvious lymphadenopathy in areas examined] : no obvious lymphadenopathy in areas examined [Peripheral vascular exam is grossly normal] : peripheral vascular exam is grossly normal [Well Nourished] : well nourished [No Respiratory Distress] : no respiratory distress [] : no rhomboid tenderness [[id number]] : stiffness all planes

## 2025-02-24 NOTE — PATIENT PROFILE ADULT - MONEY FOR FOOD
What Type Of Note Output Would You Prefer (Optional)?: Bullet Format How Severe Is Your Skin Lesion?: mild Is This A New Presentation, Or A Follow-Up?: Skin Lesions Additional History: Patient states that she has noticed a discoloration in her face, close to her jawline. She has also been struggling with bleeding and itching lesions on her right shoulder. no

## 2025-08-20 ENCOUNTER — APPOINTMENT (OUTPATIENT)
Dept: ORTHOPEDIC SURGERY | Facility: CLINIC | Age: 65
End: 2025-08-20

## (undated) DEVICE — DRAPE C ARMOUR

## (undated) DEVICE — DRAIN JACKSON PRATT 3 SPRING RESERVOIR W 7FR PVC DRAIN

## (undated) DEVICE — DRAPE TOWEL BLUE 17" X 24"

## (undated) DEVICE — NDL SPINAL 18G X 3.5"

## (undated) DEVICE — FORCEP BIPOLAR BAYONET STR 7" W 1.5MM TIP DISP

## (undated) DEVICE — DRSG STERISTRIPS 0.5X4"

## (undated) DEVICE — DRSG MASTISOL

## (undated) DEVICE — DRSG COMBINE 5X9"

## (undated) DEVICE — TUBING BIPOLAR IRRIGATOR AND CORD SET

## (undated) DEVICE — SUT PERMAHAND 2-0 18" FSL

## (undated) DEVICE — MARKER SKIN MULTI TIP 6"

## (undated) DEVICE — SUT SILK 3-0 30" TIES

## (undated) DEVICE — SPONGE PEANUT AUTO COUNT

## (undated) DEVICE — Device

## (undated) DEVICE — DISSECTING TOOL MIDAS REX 10CM 2MM

## (undated) DEVICE — DRAPE INSTRUMENT POUCH

## (undated) DEVICE — DRILL K2M MEDICAL 2.3X12MM

## (undated) DEVICE — DRAPE C ARM UNIVERSAL

## (undated) DEVICE — BLANKET WARMER LOWER ADULT

## (undated) DEVICE — DRSG TEGADERM 4X4.75"

## (undated) DEVICE — PREP CHLORAPREP ORANGE 2PCT 26ML

## (undated) DEVICE — DRSG TRACH DRAINAGE 4X4

## (undated) DEVICE — WRAP COMPRESSION CALF MED

## (undated) DEVICE — SUT MONOCRYL 3-0 27" PS-2 UNDYED

## (undated) DEVICE — TAPE SILK 3"

## (undated) DEVICE — DRAPE U LONG 47X70"

## (undated) DEVICE — ELCTR GROUNDING PAD ADULT COVIDIEN

## (undated) DEVICE — DRAPE SPLIT SHEETS 77X108"

## (undated) DEVICE — SUT VICRYL 2-0 18" CP-2 UNDYED